# Patient Record
Sex: FEMALE | Race: OTHER | HISPANIC OR LATINO | Employment: UNEMPLOYED | URBAN - METROPOLITAN AREA
[De-identification: names, ages, dates, MRNs, and addresses within clinical notes are randomized per-mention and may not be internally consistent; named-entity substitution may affect disease eponyms.]

---

## 2019-03-04 ENCOUNTER — HOSPITAL ENCOUNTER (EMERGENCY)
Facility: HOSPITAL | Age: 29
Discharge: HOME | End: 2019-03-04
Attending: EMERGENCY MEDICINE

## 2019-03-04 VITALS
TEMPERATURE: 98.5 F | DIASTOLIC BLOOD PRESSURE: 73 MMHG | RESPIRATION RATE: 18 BRPM | HEART RATE: 85 BPM | SYSTOLIC BLOOD PRESSURE: 123 MMHG | WEIGHT: 151 LBS | OXYGEN SATURATION: 100 %

## 2019-03-04 DIAGNOSIS — F41.9 ANXIETY: Primary | ICD-10-CM

## 2019-03-04 PROCEDURE — 99284 EMERGENCY DEPT VISIT MOD MDM: CPT

## 2019-03-04 NOTE — ED NOTES
Patient states that she use to take medication for depression and anxiety but has not in years;         Temo Alves RN  03/04/19 2516

## 2019-03-04 NOTE — ED PROVIDER NOTES
History  Chief Complaint   Patient presents with    Panic Attack     pt reports worsening anxiety and panick attacks over last 3 weeks  pt reports hx of anxiety and depression but has not been taking medication  pt denies SI,HI,VH,AH  History provided by:  Patient   used: No    Panic Attack   Presenting symptoms: depression    Presenting symptoms: no agitation, no homicidal ideas, no paranoid behavior and no suicidal thoughts    Degree of incapacity (severity): Moderate  Onset quality:  Unable to specify  Timing:  Intermittent  Progression:  Waxing and waning  Chronicity:  New  Context: stressful life event    Context comment:  Job, relationships  Treatment compliance:  None of the time  Time since last dose of psychoactive medication: not on meds, unable to specify  Relieved by:  Nothing  Worsened by:  Nothing  Ineffective treatments:  None tried  Associated symptoms: anxiety    Associated symptoms: no abdominal pain, no chest pain and no headaches    Risk factors: hx of mental illness        None       History reviewed  No pertinent past medical history  History reviewed  No pertinent surgical history  History reviewed  No pertinent family history  I have reviewed and agree with the history as documented  Social History     Tobacco Use    Smoking status: Current Every Day Smoker     Packs/day: 0 50    Smokeless tobacco: Current User   Substance Use Topics    Alcohol use: Yes     Alcohol/week: 1 8 oz     Types: 3 Glasses of wine per week    Drug use: Yes     Types: Marijuana        Review of Systems   Constitutional: Negative for chills and fever  HENT: Negative for facial swelling, sore throat and trouble swallowing  Eyes: Negative for pain and visual disturbance  Respiratory: Negative for cough and shortness of breath  Cardiovascular: Negative for chest pain and leg swelling     Gastrointestinal: Negative for abdominal pain, blood in stool, diarrhea, nausea and vomiting  Genitourinary: Negative for dysuria and flank pain  Musculoskeletal: Negative for back pain, neck pain and neck stiffness  Skin: Negative for pallor and rash  Allergic/Immunologic: Negative for environmental allergies and immunocompromised state  Neurological: Negative for dizziness and headaches  Hematological: Negative for adenopathy  Does not bruise/bleed easily  Psychiatric/Behavioral: Negative for agitation, behavioral problems, homicidal ideas, paranoia and suicidal ideas  The patient is nervous/anxious  All other systems reviewed and are negative  Physical Exam  Physical Exam   Constitutional: She is oriented to person, place, and time  She appears well-developed and well-nourished  No distress  HENT:   Head: Normocephalic and atraumatic  Eyes: Pupils are equal, round, and reactive to light  EOM are normal    Neck: Normal range of motion  Neck supple  Cardiovascular: Normal rate, regular rhythm, normal heart sounds and intact distal pulses  Pulmonary/Chest: Effort normal and breath sounds normal    Abdominal: Soft  Bowel sounds are normal  There is no tenderness  There is no rebound and no guarding  Musculoskeletal: Normal range of motion  Neurological: She is alert and oriented to person, place, and time  No cranial nerve deficit  Coordination normal    Skin: Skin is warm and dry  Psychiatric: She has a normal mood and affect  Nursing note and vitals reviewed        Vital Signs  ED Triage Vitals   Temperature Pulse Respirations Blood Pressure SpO2   03/04/19 1400 03/04/19 1400 03/04/19 1400 03/04/19 1401 03/04/19 1400   98 5 °F (36 9 °C) 85 18 123/73 100 %      Temp src Heart Rate Source Patient Position - Orthostatic VS BP Location FiO2 (%)   -- 03/04/19 1400 03/04/19 1400 03/04/19 1400 --    Monitor Sitting Right arm       Pain Score       03/04/19 1400       3           Vitals:    03/04/19 1400 03/04/19 1401   BP:  123/73   Pulse: 85    Patient Position - Orthostatic VS: Sitting        Visual Acuity      ED Medications  Medications - No data to display    Diagnostic Studies  Results Reviewed     None                 No orders to display              Procedures  Procedures       Phone Contacts  ED Phone Contact    ED Course                               MDM  Number of Diagnoses or Management Options  Anxiety: new and does not require workup  Diagnosis management comments: Patient is a 51-year-old female, history of anxiety, depression, not on any meds, no recent psychiatric followup as she has no insurance; feels getting more stressed and anxious these like she is having a panic attack due to the job and relationship stressors, denies suicidal or homicidal ideation  No medical history otherwise  On exam no acute distress, patient does appear tearful and stressed, Vital signs stable, pupils equal round reactive light, no cranial nerve or focal neuro deficits, lungs clear, heart sounds normal   Impression:  Anxiety, stress, panic attack, not suicidal or homicidal, no indication for inpatient psychiatric treatment, crisis worker will meet patient and give outpatient resources  Amount and/or Complexity of Data Reviewed  Discuss the patient with other providers: yes        Disposition  Final diagnoses:   Anxiety     Time reflects when diagnosis was documented in both MDM as applicable and the Disposition within this note     Time User Action Codes Description Comment    3/4/2019  2:31 PM Celia Garrett Add [F41 9] Anxiety       ED Disposition     ED Disposition Condition Date/Time Comment    Discharge Stable Mon Mar 4, 2019  2:31 PM Darwin Dies discharge to home/self care  Follow-up Information     Follow up With Specialties Details Why Contact Info Additional Information    [address]  WITH RESOURCES GIVE TO YOU   [address], [address]          There are no discharge medications for this patient  No discharge procedures on file      ED Provider  Electronically Signed by           Dandre Casey MD  03/04/19 4775

## 2019-03-04 NOTE — ED NOTES
Told patient that if she has any thoughts of SI that she needs to come back to ED; also educated patient on importance of follow up, patient verbalized understanding      Latonya Mccullough, JOSE  03/04/19 3269

## 2019-03-04 NOTE — ED NOTES
Referrals given for outpatient treatment    Patient denies suicidal and homicidal ideations at this time and has requested a therapist for anxiety

## 2019-03-04 NOTE — ED NOTES
Crisis will give patient's outpatient resources before discharge       Veronica Romero RN  03/04/19 5584

## 2019-03-16 ENCOUNTER — HOSPITAL ENCOUNTER (EMERGENCY)
Facility: HOSPITAL | Age: 29
Discharge: HOME | End: 2019-03-16
Attending: EMERGENCY MEDICINE | Admitting: EMERGENCY MEDICINE

## 2019-03-16 VITALS
RESPIRATION RATE: 16 BRPM | WEIGHT: 148.59 LBS | OXYGEN SATURATION: 98 % | TEMPERATURE: 97.9 F | SYSTOLIC BLOOD PRESSURE: 121 MMHG | DIASTOLIC BLOOD PRESSURE: 78 MMHG | HEART RATE: 67 BPM

## 2019-03-16 DIAGNOSIS — N39.0 UTI (URINARY TRACT INFECTION): ICD-10-CM

## 2019-03-16 DIAGNOSIS — O20.0 THREATENED ABORTION: Primary | ICD-10-CM

## 2019-03-16 LAB
ABO GROUP BLD: NORMAL
ABO GROUP BLD: NORMAL
B-HCG SERPL-ACNC: 4774.3 MIU/ML
BACTERIA UR QL AUTO: ABNORMAL /HPF
BILIRUB UR QL STRIP: NEGATIVE
BLD GP AB SCN SERPL QL: NEGATIVE
BLD GP AB SCN SERPL QL: NEGATIVE
CLARITY UR: ABNORMAL
COLOR UR: YELLOW
GLUCOSE UR STRIP-MCNC: NEGATIVE MG/DL
HGB UR QL STRIP.AUTO: 250
KETONES UR STRIP-MCNC: NEGATIVE MG/DL
LEUKOCYTE ESTERASE UR QL STRIP: 25
NITRITE UR QL STRIP: NEGATIVE
NON-SQ EPI CELLS URNS QL MICRO: ABNORMAL /HPF
PH UR STRIP.AUTO: 8 [PH]
PROT UR STRIP-MCNC: NEGATIVE MG/DL
RBC #/AREA URNS AUTO: ABNORMAL /HPF
RH BLD: NEGATIVE
RH BLD: NEGATIVE
SP GR UR STRIP.AUTO: 1.01 (ref 1–1.04)
SPECIMEN EXPIRATION DATE: NORMAL
UROBILINOGEN UA: NEGATIVE MG/DL
WBC #/AREA URNS AUTO: ABNORMAL /HPF

## 2019-03-16 PROCEDURE — 86901 BLOOD TYPING SEROLOGIC RH(D): CPT | Performed by: EMERGENCY MEDICINE

## 2019-03-16 PROCEDURE — 96372 THER/PROPH/DIAG INJ SC/IM: CPT

## 2019-03-16 PROCEDURE — 86900 BLOOD TYPING SEROLOGIC ABO: CPT | Performed by: EMERGENCY MEDICINE

## 2019-03-16 PROCEDURE — 86850 RBC ANTIBODY SCREEN: CPT | Performed by: EMERGENCY MEDICINE

## 2019-03-16 PROCEDURE — 87086 URINE CULTURE/COLONY COUNT: CPT | Performed by: EMERGENCY MEDICINE

## 2019-03-16 PROCEDURE — 99284 EMERGENCY DEPT VISIT MOD MDM: CPT

## 2019-03-16 PROCEDURE — 81001 URINALYSIS AUTO W/SCOPE: CPT | Performed by: EMERGENCY MEDICINE

## 2019-03-16 PROCEDURE — 84702 CHORIONIC GONADOTROPIN TEST: CPT | Performed by: EMERGENCY MEDICINE

## 2019-03-16 PROCEDURE — 36415 COLL VENOUS BLD VENIPUNCTURE: CPT | Performed by: EMERGENCY MEDICINE

## 2019-03-16 PROCEDURE — 81003 URINALYSIS AUTO W/O SCOPE: CPT | Performed by: EMERGENCY MEDICINE

## 2019-03-16 RX ORDER — CEPHALEXIN 250 MG/1
500 CAPSULE ORAL 4 TIMES DAILY
Qty: 56 CAPSULE | Refills: 0 | Status: SHIPPED | OUTPATIENT
Start: 2019-03-16 | End: 2019-03-23

## 2019-03-16 RX ORDER — ACETAMINOPHEN 325 MG/1
1000 TABLET ORAL ONCE
Status: COMPLETED | OUTPATIENT
Start: 2019-03-16 | End: 2019-03-16

## 2019-03-16 RX ADMIN — ACETAMINOPHEN 975 MG: 325 TABLET ORAL at 16:54

## 2019-03-16 RX ADMIN — RHO(D) IMMUNE GLOBULIN (HUMAN) 300 MCG: 1500 SOLUTION INTRAMUSCULAR at 16:55

## 2019-03-16 NOTE — ED PROVIDER NOTES
History  Chief Complaint   Patient presents with    Abdominal Pain     staets that she is about 7 weeks pregnant and has been having abd and back pain since this morning  states that she is also ahving nausea and vaginal bleeding     Patient is a 59-year-old  presents with a 1 day history of crampy lower abdominal pain that radiates the back as well as vaginal bleeding  Patient is currently 7 weeks pregnant  Patient is not any prenatal care and is actually planning to have an  scheduled next Saturday  States pain began today crampy low abdominal pain that radiates the back  Did not take anything for it  States used 3 pads today  Patient also endorses some nausea  No fevers sweats or chills  Nothing makes it better or worse  None       History reviewed  No pertinent past medical history  Past Surgical History:   Procedure Laterality Date    BREAST IMPLANT       SECTION         History reviewed  No pertinent family history  I have reviewed and agree with the history as documented  Social History     Tobacco Use    Smoking status: Current Every Day Smoker     Packs/day: 0 50    Smokeless tobacco: Current User   Substance Use Topics    Alcohol use: Yes     Comment: occasional    Drug use: Yes     Types: Marijuana     Comment: few times per week        Review of Systems   Constitutional: Positive for appetite change  Negative for activity change, chills and fever  HENT: Negative  Eyes: Negative  Respiratory: Negative  Negative for cough and shortness of breath  Cardiovascular: Negative  Negative for leg swelling  Gastrointestinal: Positive for abdominal pain  Endocrine: Negative  Genitourinary: Positive for vaginal bleeding  Musculoskeletal: Negative  Skin: Negative  Allergic/Immunologic: Negative  Neurological: Negative  Hematological: Negative  Psychiatric/Behavioral: Negative      All other systems reviewed and are negative  Physical Exam  Physical Exam   Constitutional: She is oriented to person, place, and time  She appears well-developed and well-nourished  HENT:   Head: Normocephalic  Mouth/Throat: Oropharynx is clear and moist    Eyes: Pupils are equal, round, and reactive to light  Cardiovascular: Normal rate and normal heart sounds  Pulmonary/Chest: Effort normal and breath sounds normal    Abdominal: Soft  Normal appearance and bowel sounds are normal  There is tenderness in the suprapubic area  There is no rigidity, no rebound, no guarding and no CVA tenderness  Genitourinary:   Genitourinary Comments: Bimanual exam done by me with Erwin Duran RN chaperone  Os closed  Minimal blood on exam    Neurological: She is alert and oriented to person, place, and time  Skin: Skin is warm and dry  Capillary refill takes less than 2 seconds  Psychiatric: She has a normal mood and affect  Nursing note and vitals reviewed        Vital Signs  ED Triage Vitals [03/16/19 1454]   Temperature Pulse Respirations Blood Pressure SpO2   97 9 °F (36 6 °C) 95 16 141/85 100 %      Temp Source Heart Rate Source Patient Position - Orthostatic VS BP Location FiO2 (%)   Tympanic Monitor Sitting Left arm --      Pain Score       --           Vitals:    03/16/19 1454   BP: 141/85   Pulse: 95   Patient Position - Orthostatic VS: Sitting       qSOFA     Row Name 03/16/19 1454                Altered mental status GCS < 15  --        Respiratory Rate > / =21  0        Systolic BP < / =481  0        Q Sofa Score  0              Visual Acuity      ED Medications  Medications - No data to display    Diagnostic Studies  Results Reviewed     Procedure Component Value Units Date/Time    UA w Reflex to Microscopic w Reflex to Culture [[id number]]     Lab Status:  No result Specimen:  Urine     hCG, quantitative [[id number]]     Lab Status:  No result Specimen:  Blood                  No orders to display              Procedures  Procedures Phone Contacts  ED Phone Contact    ED Course  ED Course as of Mar 17 0756   Sat Mar 16, 2019   1513 Bedside ultrasound done by me with positive IUP  Difficult to visualize heartbeat due to pending limited study  Patient declined to look at scan  1642 One over results with patient  Will give antibiotics patient also requesting something for the cramps  Will give Tylenol  Explained program artery status  Patient still plan of followup on Saturday the wound center  Still advised patient to take pregnancy safe drugs for now just in case change of mind  MDM  Number of Diagnoses or Management Options  Threatened :   UTI (urinary tract infection):      Amount and/or Complexity of Data Reviewed  Clinical lab tests: ordered and reviewed        Disposition  Final diagnoses:   None     ED Disposition     None      Follow-up Information    None         Patient's Medications    No medications on file     No discharge procedures on file      ED Provider  Electronically Signed by           Anita Cheng MD  [phone]

## 2019-03-17 ENCOUNTER — APPOINTMENT (EMERGENCY)
Dept: ULTRASOUND IMAGING | Facility: HOSPITAL | Age: 29
End: 2019-03-17

## 2019-03-17 ENCOUNTER — HOSPITAL ENCOUNTER (OUTPATIENT)
Facility: HOSPITAL | Age: 29
Setting detail: OBSERVATION
Discharge: HOME | End: 2019-03-18
Attending: EMERGENCY MEDICINE | Admitting: OBSTETRICS & GYNECOLOGY

## 2019-03-17 DIAGNOSIS — O03.9 SAB (SPONTANEOUS ABORTION): ICD-10-CM

## 2019-03-17 DIAGNOSIS — O03.9 MISCARRIAGE: Primary | ICD-10-CM

## 2019-03-17 PROBLEM — Z3A.01 LESS THAN 8 WEEKS GESTATION OF PREGNANCY: Status: ACTIVE | Noted: 2019-03-17

## 2019-03-17 LAB
ALBUMIN SERPL BCP-MCNC: 3.3 G/DL (ref 3.5–5)
ALP SERPL-CCNC: 49 U/L (ref 46–116)
ALT SERPL W P-5'-P-CCNC: 13 U/L (ref 12–78)
ANION GAP SERPL CALCULATED.3IONS-SCNC: 9 MMOL/L (ref 4–13)
AST SERPL W P-5'-P-CCNC: 10 U/L (ref 5–45)
B-HCG SERPL-ACNC: 4536.3 MIU/ML
BACTERIA UR CULT: NORMAL
BASOPHILS # BLD AUTO: 0.04 THOUSANDS/ΜL (ref 0–0.1)
BASOPHILS # BLD AUTO: 0.07 THOUSANDS/ΜL (ref 0–0.1)
BASOPHILS NFR BLD AUTO: 0 % (ref 0–1)
BASOPHILS NFR BLD AUTO: 1 % (ref 0–1)
BILIRUB SERPL-MCNC: 0.28 MG/DL (ref 0.2–1)
BUN SERPL-MCNC: 7 MG/DL (ref 5–25)
CALCIUM SERPL-MCNC: 8.2 MG/DL (ref 8.3–10.1)
CHLORIDE SERPL-SCNC: 107 MMOL/L (ref 100–108)
CO2 SERPL-SCNC: 24 MMOL/L (ref 21–32)
CREAT SERPL-MCNC: 0.52 MG/DL (ref 0.6–1.3)
EOSINOPHIL # BLD AUTO: 0.05 THOUSAND/ΜL (ref 0–0.61)
EOSINOPHIL # BLD AUTO: 0.2 THOUSAND/ΜL (ref 0–0.61)
EOSINOPHIL NFR BLD AUTO: 1 % (ref 0–6)
EOSINOPHIL NFR BLD AUTO: 1 % (ref 0–6)
ERYTHROCYTE [DISTWIDTH] IN BLOOD BY AUTOMATED COUNT: 13.1 % (ref 11.6–15.1)
ERYTHROCYTE [DISTWIDTH] IN BLOOD BY AUTOMATED COUNT: 13.2 % (ref 11.6–15.1)
GFR SERPL CREATININE-BSD FRML MDRD: 130 ML/MIN/1.73SQ M
GLUCOSE SERPL-MCNC: 87 MG/DL (ref 65–140)
HCT VFR BLD AUTO: 39 % (ref 34.8–46.1)
HCT VFR BLD AUTO: 46.2 % (ref 34.8–46.1)
HGB BLD-MCNC: 12.5 G/DL (ref 11.5–15.4)
HGB BLD-MCNC: 14.9 G/DL (ref 11.5–15.4)
IMM GRANULOCYTES # BLD AUTO: 0.02 THOUSAND/UL (ref 0–0.2)
IMM GRANULOCYTES # BLD AUTO: 0.04 THOUSAND/UL (ref 0–0.2)
IMM GRANULOCYTES NFR BLD AUTO: 0 % (ref 0–2)
IMM GRANULOCYTES NFR BLD AUTO: 0 % (ref 0–2)
LYMPHOCYTES # BLD AUTO: 1.29 THOUSANDS/ΜL (ref 0.6–4.47)
LYMPHOCYTES # BLD AUTO: 1.73 THOUSANDS/ΜL (ref 0.6–4.47)
LYMPHOCYTES NFR BLD AUTO: 12 % (ref 14–44)
LYMPHOCYTES NFR BLD AUTO: 14 % (ref 14–44)
MCH RBC QN AUTO: 30.3 PG (ref 26.8–34.3)
MCH RBC QN AUTO: 30.3 PG (ref 26.8–34.3)
MCHC RBC AUTO-ENTMCNC: 32.1 G/DL (ref 31.4–37.4)
MCHC RBC AUTO-ENTMCNC: 32.3 G/DL (ref 31.4–37.4)
MCV RBC AUTO: 94 FL (ref 82–98)
MCV RBC AUTO: 94 FL (ref 82–98)
MONOCYTES # BLD AUTO: 0.34 THOUSAND/ΜL (ref 0.17–1.22)
MONOCYTES # BLD AUTO: 0.62 THOUSAND/ΜL (ref 0.17–1.22)
MONOCYTES NFR BLD AUTO: 4 % (ref 4–12)
MONOCYTES NFR BLD AUTO: 4 % (ref 4–12)
NEUTROPHILS # BLD AUTO: 11.68 THOUSANDS/ΜL (ref 1.85–7.62)
NEUTROPHILS # BLD AUTO: 7.21 THOUSANDS/ΜL (ref 1.85–7.62)
NEUTS SEG NFR BLD AUTO: 81 % (ref 43–75)
NEUTS SEG NFR BLD AUTO: 82 % (ref 43–75)
NRBC BLD AUTO-RTO: 0 /100 WBCS
NRBC BLD AUTO-RTO: 0 /100 WBCS
PLATELET # BLD AUTO: 304 THOUSANDS/UL (ref 149–390)
PLATELET # BLD AUTO: 413 THOUSANDS/UL (ref 149–390)
PMV BLD AUTO: 10.1 FL (ref 8.9–12.7)
PMV BLD AUTO: 9.5 FL (ref 8.9–12.7)
POTASSIUM SERPL-SCNC: 3.9 MMOL/L (ref 3.5–5.3)
PROT SERPL-MCNC: 6.3 G/DL (ref 6.4–8.2)
RBC # BLD AUTO: 4.13 MILLION/UL (ref 3.81–5.12)
RBC # BLD AUTO: 4.92 MILLION/UL (ref 3.81–5.12)
SODIUM SERPL-SCNC: 140 MMOL/L (ref 136–145)
WBC # BLD AUTO: 14.34 THOUSAND/UL (ref 4.31–10.16)
WBC # BLD AUTO: 8.95 THOUSAND/UL (ref 4.31–10.16)

## 2019-03-17 PROCEDURE — 76801 OB US < 14 WKS SINGLE FETUS: CPT

## 2019-03-17 PROCEDURE — 80053 COMPREHEN METABOLIC PANEL: CPT | Performed by: OBSTETRICS & GYNECOLOGY

## 2019-03-17 PROCEDURE — 96376 TX/PRO/DX INJ SAME DRUG ADON: CPT

## 2019-03-17 PROCEDURE — 96361 HYDRATE IV INFUSION ADD-ON: CPT

## 2019-03-17 PROCEDURE — 85025 COMPLETE CBC W/AUTO DIFF WBC: CPT | Performed by: OBSTETRICS & GYNECOLOGY

## 2019-03-17 PROCEDURE — 99218 PR INITIAL OBSERVATION CARE/DAY 30 MINUTES: CPT | Performed by: OBSTETRICS & GYNECOLOGY

## 2019-03-17 PROCEDURE — 99285 EMERGENCY DEPT VISIT HI MDM: CPT

## 2019-03-17 PROCEDURE — 96374 THER/PROPH/DIAG INJ IV PUSH: CPT

## 2019-03-17 PROCEDURE — 84702 CHORIONIC GONADOTROPIN TEST: CPT | Performed by: EMERGENCY MEDICINE

## 2019-03-17 PROCEDURE — 96375 TX/PRO/DX INJ NEW DRUG ADDON: CPT

## 2019-03-17 PROCEDURE — 85025 COMPLETE CBC W/AUTO DIFF WBC: CPT | Performed by: EMERGENCY MEDICINE

## 2019-03-17 PROCEDURE — 36415 COLL VENOUS BLD VENIPUNCTURE: CPT | Performed by: EMERGENCY MEDICINE

## 2019-03-17 RX ORDER — ONDANSETRON 2 MG/ML
4 INJECTION INTRAMUSCULAR; INTRAVENOUS ONCE
Status: COMPLETED | OUTPATIENT
Start: 2019-03-17 | End: 2019-03-17

## 2019-03-17 RX ORDER — MORPHINE SULFATE 4 MG/ML
4 INJECTION, SOLUTION INTRAMUSCULAR; INTRAVENOUS
Status: COMPLETED | OUTPATIENT
Start: 2019-03-17 | End: 2019-03-17

## 2019-03-17 RX ORDER — HYDROMORPHONE HCL/PF 1 MG/ML
1 SYRINGE (ML) INJECTION EVERY 4 HOURS PRN
Status: DISCONTINUED | OUTPATIENT
Start: 2019-03-17 | End: 2019-03-18 | Stop reason: HOSPADM

## 2019-03-17 RX ORDER — ACETAMINOPHEN 325 MG/1
650 TABLET ORAL EVERY 6 HOURS PRN
Status: DISCONTINUED | OUTPATIENT
Start: 2019-03-17 | End: 2019-03-18 | Stop reason: HOSPADM

## 2019-03-17 RX ORDER — OXYCODONE HYDROCHLORIDE AND ACETAMINOPHEN 5; 325 MG/1; MG/1
1 TABLET ORAL EVERY 4 HOURS PRN
Status: DISCONTINUED | OUTPATIENT
Start: 2019-03-17 | End: 2019-03-18 | Stop reason: HOSPADM

## 2019-03-17 RX ORDER — DIPHENHYDRAMINE HYDROCHLORIDE 50 MG/ML
25 INJECTION INTRAMUSCULAR; INTRAVENOUS EVERY 6 HOURS PRN
Status: DISCONTINUED | OUTPATIENT
Start: 2019-03-17 | End: 2019-03-18 | Stop reason: HOSPADM

## 2019-03-17 RX ORDER — KETOROLAC TROMETHAMINE 30 MG/ML
30 INJECTION, SOLUTION INTRAMUSCULAR; INTRAVENOUS EVERY 6 HOURS PRN
Status: DISCONTINUED | OUTPATIENT
Start: 2019-03-17 | End: 2019-03-17

## 2019-03-17 RX ORDER — SODIUM CHLORIDE, SODIUM LACTATE, POTASSIUM CHLORIDE, CALCIUM CHLORIDE 600; 310; 30; 20 MG/100ML; MG/100ML; MG/100ML; MG/100ML
125 INJECTION, SOLUTION INTRAVENOUS CONTINUOUS
Status: DISCONTINUED | OUTPATIENT
Start: 2019-03-17 | End: 2019-03-18 | Stop reason: HOSPADM

## 2019-03-17 RX ADMIN — SODIUM CHLORIDE 1000 ML: 0.9 INJECTION, SOLUTION INTRAVENOUS at 17:11

## 2019-03-17 RX ADMIN — MORPHINE SULFATE 4 MG: 4 INJECTION INTRAVENOUS at 17:12

## 2019-03-17 RX ADMIN — HYDROMORPHONE HYDROCHLORIDE 1 MG: 1 INJECTION, SOLUTION INTRAMUSCULAR; INTRAVENOUS; SUBCUTANEOUS at 21:53

## 2019-03-17 RX ADMIN — ONDANSETRON 4 MG: 2 INJECTION INTRAMUSCULAR; INTRAVENOUS at 13:38

## 2019-03-17 RX ADMIN — SODIUM CHLORIDE, SODIUM LACTATE, POTASSIUM CHLORIDE, AND CALCIUM CHLORIDE 125 ML/HR: .6; .31; .03; .02 INJECTION, SOLUTION INTRAVENOUS at 18:07

## 2019-03-17 RX ADMIN — MORPHINE SULFATE 4 MG: 4 INJECTION INTRAVENOUS at 14:35

## 2019-03-17 RX ADMIN — MORPHINE SULFATE 4 MG: 4 INJECTION INTRAVENOUS at 13:38

## 2019-03-17 RX ADMIN — SODIUM CHLORIDE 1000 ML: 0.9 INJECTION, SOLUTION INTRAVENOUS at 13:39

## 2019-03-17 NOTE — H&P
H&P Exam - Gynecology   Annie Bañuelos 29 y o  female MRN: [id number]  Unit/Bed#: ED 30 Encounter: [id number]    Chief Complaint   Patient presents with    Vaginal Bleeding - Pregnant     Pt reports abd pain and vaginal bleeding that began yesterday, seen at [facility] for same  Today increased pain and bleeding  Pt has used 3 pads since 0600 this AM          History of Present Illness      HPI:  Annie Bañuelos is a 29 y  o  at [address] per LMP 19 female who presents with vaginal bleeding and cramping  Patient presented to [facility] ED yesterday with similar symptoms  Bedside ultrasound showed positive IUP  Heartbeat difficult to visualize  She was discharged with precautions after she was given rhogam for Rh negative status (confirmed given at 1656)  Patient states that her vaginal bleeding started again this morning  She has been changing her pad once ever hour  Upon initial evaluation, her pain was 10/10 and generalized  Now, she has 0/10 pain after being given 4mg x 3 (total 12mg morphine) since initial eval for severe pain  Patient states she has been passing clots but no products of conception  Imaging showed 6w3d intrauterine pregnancy  However, the gestational sac is located low in the uterine cavity, adjacent to a  scar so implantation at the scar cannot be excluded  Due to risk of  ectopic, OBGYN was consulted  Brief OBGYN history:     Patient is a   This is her 3rd pregnancy  She had one  section in  secondary to arrest of dilation at [facility]  She then had an elective termination in   This is not a planned pregnancy  She was on the pill for contraception but is unsure what it is called  She has a history of irregular menses  Relevant surgical history:  section x 1, breast implantation, abdominoplasty    Review of Systems   Constitutional: Negative  Respiratory: Negative  Cardiovascular: Negative  Gastrointestinal: Negative  Genitourinary: Negative  Musculoskeletal: Negative  Historical Information   History reviewed  No pertinent past medical history  Past Surgical History:   Procedure Laterality Date    BREAST IMPLANT       SECTION       History reviewed  No pertinent family history  Social History   Social History     Substance and Sexual Activity   Alcohol Use Yes    Comment: occasional     Social History     Substance and Sexual Activity   Drug Use Yes    Types: Marijuana    Comment: few times per week     Social History     Tobacco Use   Smoking Status Current Every Day Smoker    Packs/day: 0 50   Smokeless Tobacco Current User       Meds/Allergies     (Not in a hospital admission)  No Known Allergies    Objective   /66 (BP Location: Left arm)   Pulse 78   Temp (!) 97 1 °F (36 2 °C) (Temporal)   Resp 18   Wt 64 kg (141 lb 3 2 oz)   LMP 2019 (Approximate)   SpO2 100%     No intake or output data in the 24 hours ending 19    Invasive Devices: Invasive Devices     Peripheral Intravenous Line            Peripheral IV 19 Right Antecubital less than 1 day              Physical Exam   Constitutional: She is oriented to person, place, and time  She appears well-developed and well-nourished  Cardiovascular: Normal rate, regular rhythm and normal heart sounds  Pulmonary/Chest: Effort normal and breath sounds normal    Abdominal: Soft  Bowel sounds are normal    Genitourinary:   Genitourinary Comments: Speculum exam: dark red blood pooling in posterior fornix and coating the cervix and vaginal side walls, cervix appears 5mm dilated with no POC visualized  No active bleeding  Bimanual exam was unremarkable  Negative CMT  Uterus palpated to be slightly larger than nongravid uterus but non tender  No adnexal tenderness or fullness  Neurological: She is alert and oriented to person, place, and time  Vitals reviewed       Lab Results:   Admission on 2019   Component Date Value    HCG, Quant 2019 4,536 3*    WBC 2019 14 34*    RBC 2019 4 92     Hemoglobin 2019 14 9     Hematocrit 2019 46 2*    MCV 2019 94     MCH 2019 30 3     MCHC 2019 32 3     RDW 2019 13 2     MPV 2019 10 1     Platelets  413*    nRBC 2019 0     Neutrophils Relative 2019 82*    Immat GRANS % 2019 0     Lymphocytes Relative 2019 12*    Monocytes Relative 2019 4     Eosinophils Relative 2019 1     Basophils Relative 2019 1     Neutrophils Absolute 2019 11 68*    Immature Grans Absolute 2019 0 04     Lymphocytes Absolute 2019 1 73     Monocytes Absolute 2019 0 62     Eosinophils Absolute 2019 0 20     Basophils Absolute 2019 0 07     WBC 2019 8 95     RBC 2019 4 13     Hemoglobin 2019 12 5     Hematocrit 2019 39 0     MCV 2019 94     MCH 2019 30 3     MCHC 2019 32 1     RDW 2019 13 1     MPV 2019 9 5     Platelets  304     nRBC 2019 0     Neutrophils Relative 2019 81*    Immat GRANS % 2019 0     Lymphocytes Relative 2019 14     Monocytes Relative 2019 4     Eosinophils Relative 2019 1     Basophils Relative 2019 0     Neutrophils Absolute 2019 7 21     Immature Grans Absolute 2019 0 02     Lymphocytes Absolute 2019 1 29     Monocytes Absolute 2019 0 34     Eosinophils Absolute 2019 0 05     Basophils Absolute 2019 0 04       Imaging:     FIRST TRIMESTER OBSTETRIC ULTRASOUND, COMPLETE     INDICATION: 30-year-old pregnant woman, 1st trimester, complaining of low back and pelvic pain and vaginal bleeding for 2 days  Rule out ectopic pregnancy  LMP is 2019  Quantitative beta hC mIU/ml  Yesterday, it was 4774 mIU/ml    History   of       COMPARISON: None      TECHNIQUE:   Transabdominal ultrasound of the pelvis was performed  Additional transvaginal imaging was then performed to better assess the gestation, myometrial/endometrial architecture and ovarian parenchymal detail  The study includes volumetric   sweeps and traditional still imaging technique       FINDINGS:     UTERUS:     UTERINE CAVITY:  INTRAUTERINE GESTATIONAL SAC: Present with somewhat irregular contour  Appears to be implanted and/or near a  scar  YOLK SAC:  Present and normal in size and appearance  Robert Ingles MEAN GESTATIONAL SAC DIAMETER: 13 mm = 5 weeks 3 days +/- 1 weeks 0 days  EMBRYO / FETUS PRESENT: Single embryo present  MEAN CROWN RUMP LENGTH:  6 to 7 mm mm = 6 weeks 3 days +/- 2 days  CARDIAC ACTIVITY is not present  EMBRYONIC ANATOMY:  Appropriate for gestational age  AMNIOTIC FLUID/SAC SHAPE:  Somewhat irregular contour  Decidual reaction less prominent than normal   SUBCHORIONIC COLLECTION: None     MYOMETRIUM: Normal myometrial echogenicity  No evidence of uterine mass   scar in the anterior aspect of the lower uterine segment  CERVIX: Normal  Endocervical canal closed      ADNEXA:  Left ovary:  3 3 x 1 4 x 2 6 cm  No mass  Right ovary:  2 7 x 1 8 x 2 6 cm  Small hemorrhagic corpus luteum  Otherwise, no mass      FREE FLUID: No free fluid present         IMPRESSION:     1  Single intrauterine pregnancy with a crown-rump length equivalent to a gestational age of 7 weeks 3 days  2   Absent embryonic cardiac activity and somewhat irregular contour of the gestational sac, findings consistent with nonviable pregnancy  3   The gestational sac is located low in the uterine cavity, adjacent to a  scar  Implantation at the scar cannot be excluded  Assessment/Plan     A/P:     29yo  at 6w6d by LMP with spontaneous  of IUP vs  ectopic      1) IUP miscarriage vs  ectopic    With absent embryonic activity in a gestation with 6-7mm mean crown rump length at 6w3d and clinical history of vaginal bleeding and abdominal cramping, this is likely an intrauterine miscarriage  However, per radiology, a  ectopic pregnancy cannot be excluded as the gestational sac is located low in the uterine cavity  We discussed with patient both of these scenarios and need for further imaging for diagnosis  We discussed that if this is an IUP, we can manage expectantly, medically, or surgically  If this is a  ectopic, we will need to discuss management options including methotrexate vs surgical resection  Because there is a concern for  ectopic, we discussed with patient that she needs to stay in the hospital for observation and obtain MRI in the morning  Will keep NPO and type and cross 2U pRBC     - S/p rhogam 3/16  - F/u CMP and repeat CBC at   - F/u CBC and b-hcg at 0600 3/18  - Type and cross for 2U pRBC  Starting Hgb 14 9g/dL  - MRI to rule out  ectopic - talked to Dr Shelbie Bhat of radiology who said that they can complete MRI tomorrow morning   - Analgesia: tylenol, roxicodone, morphine prn    2) Diet: NPO   - Last meal was at 0100 this morning   - She has only been drinking water since then      3) DVT ppx: ambulation, SCDs     D/w Dr Priscilla Harris    Code Status: No Order  Advance Directive and Living Will:      Power of :    POLST:      Wilfrido Barraza MD  3/17/2019  6:28 PM

## 2019-03-17 NOTE — ED NOTES
Pt called Rn into bathroom where pt unsure of what passed a specimen into the toilet; specimen collected into sterile container       Uday Duggan RN  03/17/19 1851

## 2019-03-17 NOTE — ED PROVIDER NOTES
History  Chief Complaint   Patient presents with    Vaginal Bleeding - Pregnant     Pt reports abd pain and vaginal bleeding that began yesterday, seen at [facility] for same  Today increased pain and bleeding  Pt has used 3 pads since 0600 this AM      80-year-old  7 week pregnant female presents for evaluation of vaginal bleeding and suprapubic pain  The patient states that she began with bleeding yesterday  She was seen at [facility] and discharged with threatened miscarriage  She states that since that emergency department visit, she has had worsening pain and increased bleeding with clots  The patient denies any associated fever, chills  She has nausea  The patient has not had any prenatal care to this point and is planning to terminate the pregnancy  Prior to Admission Medications   Prescriptions Last Dose Informant Patient Reported? Taking? cephalexin (KEFLEX) 250 mg capsule   No No   Sig: Take 2 capsules (500 mg total) by mouth 4 (four) times a day for 7 days      Facility-Administered Medications: None       History reviewed  No pertinent past medical history  Past Surgical History:   Procedure Laterality Date    BREAST IMPLANT       SECTION         History reviewed  No pertinent family history  I have reviewed and agree with the history as documented  Social History     Tobacco Use    Smoking status: Current Every Day Smoker     Packs/day: 0 50    Smokeless tobacco: Current User   Substance Use Topics    Alcohol use: Yes     Comment: occasional    Drug use: Yes     Types: Marijuana     Comment: few times per week        Review of Systems   Constitutional: Negative for chills and fever  Gastrointestinal: Positive for abdominal pain, nausea and vomiting  Genitourinary: Positive for pelvic pain and vaginal bleeding  All other systems reviewed and are negative        Physical Exam  Physical Exam   Constitutional: She is oriented to person, place, and time  She appears well-developed and well-nourished  No distress  In pain   HENT:   Head: Normocephalic and atraumatic  Right Ear: External ear normal    Left Ear: External ear normal    Eyes: Pupils are equal, round, and reactive to light  Conjunctivae and EOM are normal  No scleral icterus  Neck: Normal range of motion  Cardiovascular: Normal rate, regular rhythm and normal heart sounds  Pulmonary/Chest: Effort normal and breath sounds normal  No respiratory distress  Abdominal: Soft  Bowel sounds are normal  There is tenderness  There is no rebound and no guarding  Musculoskeletal: Normal range of motion  She exhibits no edema  Neurological: She is alert and oriented to person, place, and time  Skin: Skin is warm and dry  No rash noted  Psychiatric: She has a normal mood and affect  Nursing note and vitals reviewed        Vital Signs  ED Triage Vitals   Temperature Pulse Respirations Blood Pressure SpO2   03/17/19 1158 03/17/19 1159 03/17/19 1159 03/17/19 1159 03/17/19 1159   (!) 97 1 °F (36 2 °C) 70 18 136/63 98 %      Temp Source Heart Rate Source Patient Position - Orthostatic VS BP Location FiO2 (%)   03/17/19 1158 03/17/19 1536 03/17/19 1159 03/17/19 1159 --   Temporal Monitor Sitting Right arm       Pain Score       03/17/19 1158       Worst Possible Pain           Vitals:    03/17/19 1159 03/17/19 1536 03/17/19 1703   BP: 136/63 93/57 (!) 88/52   Pulse: 70 63 66   Patient Position - Orthostatic VS: Sitting Lying Lying       qSOFA     Row Name 03/17/19 1703 03/17/19 1536 03/17/19 1159          Altered mental status GCS < 15  --  --  --      Respiratory Rate > / =22  0  0  0      Systolic BP < / =897  1  1  0      Q Sofa Score  1  1  0            Visual Acuity      ED Medications  Medications   sodium chloride 0 9 % bolus 1,000 mL (1,000 mL Intravenous New Bag 3/17/19 1711)   sodium chloride 0 9 % bolus 1,000 mL (0 mL Intravenous Stopped 3/17/19 1434)   morphine (PF) 4 mg/mL injection 4 mg (4 mg Intravenous Given 3/17/19 1712)   ondansetron (ZOFRAN) injection 4 mg (4 mg Intravenous Given 3/17/19 1338)       Diagnostic Studies  Results Reviewed     Procedure Component Value Units Date/Time    CBC and differential [[id number]]  (Abnormal) Collected:  03/17/19 1626    Lab Status:  Final result Specimen:  Blood from Arm, Right Updated:  03/17/19 1632     WBC 14 34 Thousand/uL      RBC 4 92 Million/uL      Hemoglobin 14 9 g/dL      Hematocrit 46 2 %      MCV 94 fL      MCH 30 3 pg      MCHC 32 3 g/dL      RDW 13 2 %      MPV 10 1 fL      Platelets 297 Thousands/uL      nRBC 0 /100 WBCs      Neutrophils Relative 82 %      Immat GRANS % 0 %      Lymphocytes Relative 12 %      Monocytes Relative 4 %      Eosinophils Relative 1 %      Basophils Relative 1 %      Neutrophils Absolute 11 68 Thousands/µL      Immature Grans Absolute 0 04 Thousand/uL      Lymphocytes Absolute 1 73 Thousands/µL      Monocytes Absolute 0 62 Thousand/µL      Eosinophils Absolute 0 20 Thousand/µL      Basophils Absolute 0 07 Thousands/µL     Quantitative hCG [[id number]]  (Abnormal) Collected:  03/17/19 1340    Lab Status:  Final result Specimen:  Blood from Arm, Right Updated:  03/17/19 1424     HCG, Quant 4,536 3 mIU/mL     Narrative:        Expected Ranges:   Approximate               Approximate HCG  Gestation age          Concentration ( mIU/mL)  _____________          ______________________   Alba Gustavo                      HCG values  0 2-1                       5-50  1-2                           2-3                         100-5000  3-4                         500-42216  4-5                         1000-92876  5-6                         [id number]  6-8                         55110-106797  8-12                        [id number]                 US OB < 14 weeks with transvaginal   Final Result by Socorro Garcia MD (03/17 1644)      1    Single intrauterine pregnancy with a crown-rump length equivalent to a gestational age of 7 weeks 3 days  2   Absent embryonic cardiac activity and somewhat irregular contour of the gestational sac, findings consistent with nonviable pregnancy  3   The gestational sac is located low in the uterine cavity, adjacent to a  scar  Implantation at the scar cannot be excluded  I personally discussed this study with [place] on 3/17/2019 at 4:44 PM                Workstation performed: [id number]                    Procedures  Procedures       Phone Contacts  ED Phone Contact    ED Course  ED Course as of Mar 17 173   Sun Mar 17, 2019   1511 OB resident paged      [phone] Discussed with OB  Plan is to get a formal ultrasound  Further review of the record yesterday demonstrates the patient had a bedside ultrasound with out clear heartbeat  There is no definitive IUP on examination today and formal ultrasound will be obtained to rule out ectopic  I do note that the patient was noted to be Rh negative yesterday and there is no evidence that RhoGAM was given      1639 Non viable low SIUP 6w3D +/- 3D 6mm length  Not well formed sac  Near  scar  Per d/w Dr Aidee Reed      1881 OB resident paged      [phone] Discussed with Mustapha Ramos from [facility]  51    She will discussed with Radiology and then see the patient at the bedside      1736 Patient seen by OBGYN plans to keep the patient for observation and order pelvic MRI                                  MDM  Number of Diagnoses or Management Options  Miscarriage: new and requires workup  Diagnosis management comments: Differential diagnosis:  Threatened miscarriage, inevitable miscarriage, missed AB, ectopic, other    All labs reviewed and utilized in the medical decision making process    All radiology studies independently viewed by me and interpreted by the radiologist          Amount and/or Complexity of Data Reviewed  Clinical lab tests: ordered and reviewed  Tests in the radiology section of CPT®: ordered and reviewed  Review and summarize past medical records: yes  Discuss the patient with other providers: yes  Independent visualization of images, tracings, or specimens: yes        Disposition  Final diagnoses:   Miscarriage     Time reflects when diagnosis was documented in both MDM as applicable and the Disposition within this note     Time User Action Codes Description Comment    3/17/2019  4:52 PM Elba Sánchez Add [O03 9] Miscarriage       ED Disposition     ED Disposition Condition Date/Time Comment    Admit Stable Sun Mar 17, 2019  5:39 PM Case was discussed with Dr Ivon Malik and the patient's admission status was agreed to be Admission Status: observation status to the service of Dr Ivon Malik   Follow-up Information    None         Patient's Medications   Discharge Prescriptions    No medications on file     No discharge procedures on file      ED Provider  Electronically Signed by           Chani Gallegos DO  03/17/19 8004

## 2019-03-17 NOTE — LETTER
[address] [address]  Dept: [phone]    March 18, 2019     Patient: Goldie Ruiz   YOB: 1990   Date of Visit: 3/17/2019       To Whom it May Concern:    Goldie Ruiz is under my professional care  She was admitted to the hospital from 3/17/2019   to 03/18/19  She should remain out of work until she is cleared by her physicians at her next outpatient visit, which will occur in approximately 1 week  If you have any questions or concerns, please don't hesitate to call           Sincerely,          Benjaman Gowers, MD

## 2019-03-18 VITALS
HEART RATE: 63 BPM | TEMPERATURE: 98.8 F | BODY MASS INDEX: 34.59 KG/M2 | RESPIRATION RATE: 16 BRPM | DIASTOLIC BLOOD PRESSURE: 70 MMHG | WEIGHT: 149.47 LBS | OXYGEN SATURATION: 96 % | SYSTOLIC BLOOD PRESSURE: 122 MMHG | HEIGHT: 55 IN

## 2019-03-18 PROBLEM — O03.9 SAB (SPONTANEOUS ABORTION): Status: ACTIVE | Noted: 2019-03-18

## 2019-03-18 LAB
B-HCG SERPL-ACNC: 2460.8 MIU/ML
BASOPHILS # BLD AUTO: 0.03 THOUSANDS/ΜL (ref 0–0.1)
BASOPHILS NFR BLD AUTO: 1 % (ref 0–1)
EOSINOPHIL # BLD AUTO: 0.23 THOUSAND/ΜL (ref 0–0.61)
EOSINOPHIL NFR BLD AUTO: 4 % (ref 0–6)
ERYTHROCYTE [DISTWIDTH] IN BLOOD BY AUTOMATED COUNT: 13.1 % (ref 11.6–15.1)
HCT VFR BLD AUTO: 35.5 % (ref 34.8–46.1)
HGB BLD-MCNC: 11.5 G/DL (ref 11.5–15.4)
IMM GRANULOCYTES # BLD AUTO: 0.01 THOUSAND/UL (ref 0–0.2)
IMM GRANULOCYTES NFR BLD AUTO: 0 % (ref 0–2)
LYMPHOCYTES # BLD AUTO: 1.76 THOUSANDS/ΜL (ref 0.6–4.47)
LYMPHOCYTES NFR BLD AUTO: 27 % (ref 14–44)
MCH RBC QN AUTO: 30.2 PG (ref 26.8–34.3)
MCHC RBC AUTO-ENTMCNC: 32.4 G/DL (ref 31.4–37.4)
MCV RBC AUTO: 93 FL (ref 82–98)
MONOCYTES # BLD AUTO: 0.43 THOUSAND/ΜL (ref 0.17–1.22)
MONOCYTES NFR BLD AUTO: 7 % (ref 4–12)
NEUTROPHILS # BLD AUTO: 4.19 THOUSANDS/ΜL (ref 1.85–7.62)
NEUTS SEG NFR BLD AUTO: 61 % (ref 43–75)
NRBC BLD AUTO-RTO: 0 /100 WBCS
PLATELET # BLD AUTO: 254 THOUSANDS/UL (ref 149–390)
PMV BLD AUTO: 9.5 FL (ref 8.9–12.7)
RBC # BLD AUTO: 3.81 MILLION/UL (ref 3.81–5.12)
WBC # BLD AUTO: 6.65 THOUSAND/UL (ref 4.31–10.16)

## 2019-03-18 PROCEDURE — 99225 PR SBSQ OBSERVATION CARE/DAY 25 MINUTES: CPT | Performed by: OBSTETRICS & GYNECOLOGY

## 2019-03-18 PROCEDURE — 84702 CHORIONIC GONADOTROPIN TEST: CPT | Performed by: OBSTETRICS & GYNECOLOGY

## 2019-03-18 PROCEDURE — 85025 COMPLETE CBC W/AUTO DIFF WBC: CPT | Performed by: OBSTETRICS & GYNECOLOGY

## 2019-03-18 RX ORDER — LANOLIN ALCOHOL/MO/W.PET/CERES
3 CREAM (GRAM) TOPICAL
Status: DISCONTINUED | OUTPATIENT
Start: 2019-03-18 | End: 2019-03-18

## 2019-03-18 RX ORDER — LANOLIN ALCOHOL/MO/W.PET/CERES
3 CREAM (GRAM) TOPICAL
Status: DISCONTINUED | OUTPATIENT
Start: 2019-03-18 | End: 2019-03-18 | Stop reason: HOSPADM

## 2019-03-18 RX ORDER — IBUPROFEN 600 MG/1
600 TABLET ORAL EVERY 6 HOURS PRN
Qty: 50 TABLET | Refills: 1 | Status: SHIPPED | OUTPATIENT
Start: 2019-03-18 | End: 2021-12-09

## 2019-03-18 RX ORDER — MISOPROSTOL 200 UG/1
800 TABLET ORAL ONCE
Status: COMPLETED | OUTPATIENT
Start: 2019-03-18 | End: 2019-03-18

## 2019-03-18 RX ADMIN — DIPHENHYDRAMINE HYDROCHLORIDE 25 MG: 50 INJECTION INTRAMUSCULAR; INTRAVENOUS at 00:05

## 2019-03-18 RX ADMIN — MELATONIN 3 MG: 3 TAB ORAL at 03:08

## 2019-03-18 RX ADMIN — OXYCODONE HYDROCHLORIDE AND ACETAMINOPHEN 1 TABLET: 5; 325 TABLET ORAL at 09:29

## 2019-03-18 RX ADMIN — SODIUM CHLORIDE, SODIUM LACTATE, POTASSIUM CHLORIDE, AND CALCIUM CHLORIDE 125 ML/HR: .6; .31; .03; .02 INJECTION, SOLUTION INTRAVENOUS at 02:15

## 2019-03-18 RX ADMIN — ACETAMINOPHEN 650 MG: 325 TABLET ORAL at 11:03

## 2019-03-18 RX ADMIN — MISOPROSTOL 800 MCG: 200 TABLET ORAL at 09:40

## 2019-03-18 RX ADMIN — HYDROMORPHONE HYDROCHLORIDE 1 MG: 1 INJECTION, SOLUTION INTRAMUSCULAR; INTRAVENOUS; SUBCUTANEOUS at 07:30

## 2019-03-18 NOTE — PLAN OF CARE
Problem: PAIN - ADULT  Goal: Verbalizes/displays adequate comfort level or baseline comfort level  Description  Interventions:  - Encourage patient to monitor pain and request assistance  - Assess pain using appropriate pain scale  - Administer analgesics based on type and severity of pain and evaluate response  - Implement non-pharmacological measures as appropriate and evaluate response  - Consider cultural and social influences on pain and pain management  - Notify physician/advanced practitioner if interventions unsuccessful or patient reports new pain  Outcome: Progressing     Problem: Knowledge Deficit  Goal: Patient/family/caregiver demonstrates understanding of disease process, treatment plan, medications, and discharge instructions  Description  Complete learning assessment and assess knowledge base    Interventions:  - Provide teaching at level of understanding  - Provide teaching via preferred learning methods  Outcome: Progressing     Problem: HEMATOLOGIC - ADULT  Goal: Maintains hematologic stability  Description  INTERVENTIONS  - Assess for signs and symptoms of bleeding or hemorrhage  - Monitor labs  - Administer supportive blood products/factors as ordered and appropriate  Outcome: Progressing

## 2019-03-18 NOTE — PROGRESS NOTES
Cytotec 800mcg MA administered  Advised patient to remain in bed x30m to allow pills to dissolve and absorb  After this time she may be discharged to home  She has already received Rhogam for Rh negative status on 3/16/19  Reviewed expected clinical course after cytotec, including bleeding which may be heavy for a few hours during active passage of tissue  Advised on return precautions  Will prescribe motrin 600mg PO tabs and provide work note for this week  Discussed that I recommend follow up in our office at the end of this week to repeat [address]  Patient agreeable to this      Sharron Arias MD  03/18/19  9:48 AM

## 2019-03-18 NOTE — UTILIZATION REVIEW
Initial Clinical Review    Admission: Date/Time/Statement:   OBS  ORDER   3/17  @  1854   Orders Placed This Encounter   Procedures    Place in Observation     Standing Status:   Standing     Number of Occurrences:   1     Order Specific Question:   Admitting Physician     Answer:   Tiffanie Quintero [[id number]]     Order Specific Question:   Level of Care     Answer:   Med Surg [16]     ED: Date/Time/Mode of Arrival:   ED Arrival Information     Expected Arrival Acuity Means of Arrival Escorted By Service Admission Type    - 3/17/2019 11:53 Urgent Wheelchair Self OB/GYN Urgent    Arrival Complaint    Vag bleed,poss micarrage        Chief Complaint:   Chief Complaint   Patient presents with    Vaginal Bleeding - Pregnant     Pt reports abd pain and vaginal bleeding that began yesterday, seen at [facility] for same  Today increased pain and bleeding  Pt has used 3 pads since 0600 this AM      Assessment/Plan: 29yo  at 6w6d by LMP with spontaneous  of IUP vs  ectopic  Single intrauterine pregnancy with a crown-rump length equivalent to a gestational age of 7 weeks 3 days  2   Absent embryonic cardiac activity and somewhat irregular contour of the gestational sac, findings consistent with nonviable pregnancy  3   The gestational sac is located low in the uterine cavity, adjacent to a  scar   Implantation at the scar cannot be excluded    Kaley Stack is a 29 y  o  at [address] per LMP 19 female who presents with vaginal bleeding and cramping  Patient presented to [facility] ED yesterday with similar symptoms  Bedside ultrasound showed positive IUP  Heartbeat difficult to visualize  She was discharged with precautions after she was given rhogam for Rh negative status (confirmed given at 1656)  Patient states that her vaginal bleeding started again this morning  She has been changing her pad once ever hour  Upon initial evaluation, her pain was 10/10 and generalized   Now, she has 0/10 pain after being given 4mg x 3 (total 12mg morphine) since initial eval for severe pain  Patient states she has been passing clots but no products of conception      Imaging showed 6w3d intrauterine pregnancy  However, the gestational sac is located low in the uterine cavity, adjacent to a  scar so implantation at the scar cannot be excluded  Due to risk of  ectopic, OBGYN was consulted       Brief OBGYN history:      Patient is a   This is her 3rd pregnancy  She had one  section in  secondary to arrest of dilation at [facility]  She then had an elective termination in   This is not a planned pregnancy  She was on the pill for contraception but is unsure what it is called  She has a history of irregular menses     1) IUP miscarriage vs  ectopic     With absent embryonic activity in a gestation with 6-7mm mean crown rump length at 6w3d and clinical history of vaginal bleeding and abdominal cramping, this is likely an intrauterine miscarriage  However, per radiology, a  ectopic pregnancy cannot be excluded as the gestational sac is located low in the uterine cavity  We discussed with patient both of these scenarios and need for further imaging for diagnosis  We discussed that if this is an IUP, we can manage expectantly, medically, or surgically  If this is a  ectopic, we will need to discuss management options including methotrexate vs surgical resection  Because there is a concern for  ectopic, we discussed with patient that she needs to stay in the hospital for observation and obtain MRI in the morning  Will keep NPO and type and cross 2U pRBC     - S/p rhogam 3/16  - F/u CMP and repeat CBC at   - F/u CBC and b-hcg at 0600 3/18  - Type and cross for 2U pRBC  Starting Hgb 14 9g/dL    - MRI to rule out  ectopic - talked to Dr Julienne Morris of radiology who said that they can complete MRI tomorrow morning   - Analgesia: tylenol, roxicodone, morphine prn     2) Diet: NPO   - Last meal was at 0100 this morning   - She has only been drinking water since then      3) DVT ppx: ambulation, SCDs         ED Vital Signs:   ED Triage Vitals   Temperature Pulse Respirations Blood Pressure SpO2   19 1158 19 1159 19 1159 19 1159 19 1159   (!) 97 1 °F (36 2 °C) 70 18 136/63 98 %      Temp Source Heart Rate Source Patient Position - Orthostatic VS BP Location FiO2 (%)   19 1158 19 1536 19 1159 19 1159 --   Temporal Monitor Sitting Right arm       Pain Score       19 1158       Worst Possible Pain        Wt Readings from Last 1 Encounters:   19 67 8 kg (149 lb 7 6 oz)     Vital Signs (abnormal):    above    Pertinent Labs/Diagnostic Test Results:    WBC   14 34  Abs neutro    11 68  platelts   148  Albumin  3 3  H/H   12 5/39    (  3/17)            11 5/35 5  (  3/18)  Trans  Vag  U/S:      1   Single intrauterine pregnancy with a crown-rump length equivalent to a gestational age of 7 weeks 3 days  2   Absent embryonic cardiac activity and somewhat irregular contour of the gestational sac, findings consistent with nonviable pregnancy  3   The gestational sac is located low in the uterine cavity, adjacent to a  scar   Implantation at the scar cannot be excluded      ED Treatment:   Medication Administration from 2019 1153 to 2019 1940       Date/Time Order Dose Route Action Action by Comments     2019 1434 sodium chloride 0 9 % bolus 1,000 mL 0 mL Intravenous Stopped Fifi Fowler RN      2019 1339 sodium chloride 0 9 % bolus 1,000 mL 1,000 mL Intravenous New Bag Martha Day RN      2019 1712 morphine (PF) 4 mg/mL injection 4 mg 4 mg Intravenous Given Fifi Fowler RN      2019 1435 morphine (PF) 4 mg/mL injection 4 mg 4 mg Intravenous Given Fifi Fowler RN      2019 1338 morphine (PF) 4 mg/mL injection 4 mg 4 mg Intravenous Given Martha Day RN      2019 1338 ondansetron (ZOFRAN) injection 4 mg 4 mg Intravenous Given Martha Day RN      2019 1801 sodium chloride 0 9 % bolus 1,000 mL 0 mL Intravenous Stopped Jennifer Vela RN      2019 1711 sodium chloride 0 9 % bolus 1,000 mL 1,000 mL Intravenous [address] Jennifer Vela RN      2019 1807 lactated ringers infusion 125 mL/hr Intravenous New Bag Jennifer Vela RN         Past Medical/Surgical History: Active Ambulatory Problems     Diagnosis Date Noted    No Active Ambulatory Problems     Resolved Ambulatory Problems     Diagnosis Date Noted    No Resolved Ambulatory Problems     No Additional Past Medical History     Admitting Diagnosis: Miscarriage [O03 9]  Vaginal bleeding [N93 9]     Age/Sex: 29 y o  female     Admission Orders:    OBS   3/17  @     7904  Scheduled Meds:   Current Facility-Administered Medications:  acetaminophen 650 mg Oral Q6H PRN Omer Chou MD    diphenhydrAMINE 25 mg Intravenous Q6H PRN Omer Chou MD    HYDROmorphone 1 mg Intravenous Q4H PRN Omer Chou MD    lactated ringers 125 mL/hr Intravenous Continuous Omer Chou MD Last Rate: 125 mL/hr (19)   melatonin 3 mg Oral HS Omer Chou MD    misoprostol 800 mcg Rectal Once Jesse Anna MD    oxyCODONE-acetaminophen 1 tablet Oral Q4H PRN Omer Chou MD      Continuous Infusions:   lactated ringers 125 mL/hr Last Rate: 125 mL/hr (19)     PRN Meds:   acetaminophen    diphenhydrAMINE    HYDROmorphone    oxyCODONE-acetaminophen     IV  Dilaudid  PRN (  x1  3/17    And  X 1  3/18  Thus far)  IV  MSO4  PRN  (  x3  3/17)    PROGRESS  NOTE   3/18  Jose Zuniga is a 29 y o   female at [facility] admitted for vaginal bleeding and downtrending quants secondary to SAB vs   ectopic     1   SAB vs  ectopic              abd exam and vitals are stable              Hgb 14  9 -> 12 5 -> 11 5 [facility] 4774 -> 4536 -> 6300 8              CMP Cr: 0 52, AST/ALT: 10/13              Typed and crossed 2units pRBC  2  Analgesia              -tylenol, roxicodone, dilaudid  3  Insomnia              -melatonin, benadryl  4  FEN              -NPO  No acute events overnight  She has mild cramping but otherwise pain is controlled  Last pain med (dilaudid) at 2153 last night  Pt is NPO currently  Pt is passing flatus  Pt is ambulating  When she voids, there are small clots in the toilet  Denies fevers/chills  She reports vaginal bleeding has improved at this time (same amount as a period)    Network Utilization Review Department  Phone: [phone]; Fax [phone]  [email or web address]  ATTENTION: Please call with any questions or concerns to [phone]  and carefully listen to the prompts so that you are directed to the right person  Send all requests for admission clinical reviews, approved or denied determinations and any other requests to fax [phone]   All voicemails are confidential

## 2019-03-18 NOTE — PROGRESS NOTES
Gynecology Progress note   Annie Bañuelos 29 y o  female MRN: [id number]  Unit/Bed#: E2 -01 Encounter: [id number]    A/P: Annie Bañuelos is a 29 y o   female at 7w8d admitted for vaginal bleeding and downtrending quants secondary to SAB vs   ectopic    1  SAB vs  ectopic   abd exam and vitals are stable   Hgb 14  9 -> 12 5 -> 11 5   bhcg 4774 -> 4536 -> 2460 8   CMP Cr: 0 52, AST/ALT: 10/13   Typed and crossed 2units pRBC  2  Analgesia   -tylenol, roxicodone, dilaudid  3  Insomnia   -melatonin, benadryl  4  FEN   -NPO  5  DVT ppx   -ambulation, SCDs     Subjective    No acute events overnight  She has mild cramping but otherwise pain is controlled  Last pain med (dilaudid) at 2153 last night  Pt is NPO currently  Pt is passing flatus  Pt is ambulating  When she voids, there are small clots in the toilet  Denies fevers/chills  She reports vaginal bleeding has improved at this time (same amount as a period)    Review of Systems   Constitutional: Negative for chills and fever  Eyes: Negative for visual disturbance  Respiratory: Negative for shortness of breath  Cardiovascular: Negative for chest pain  Gastrointestinal: Negative for constipation, diarrhea, nausea and vomiting  Cramping   Genitourinary: Positive for vaginal bleeding  Negative for pelvic pain, urgency, vaginal discharge and vaginal pain  Neurological: Negative for headaches  Objective    /74 (BP Location: Right arm)   Pulse 64   Temp (!) 97 2 °F (36 2 °C) (Tympanic)   Resp 20   Ht 4' 0 11" (1 222 m)   Wt 67 8 kg (149 lb 7 6 oz)   LMP 2019 (Approximate)   SpO2 97%   BMI 45 40 kg/m²     No intake/output data recorded    I/O this shift:  In: -   Out: 425 [Urine:425]    Lab Results   Component Value Date    WBC 6 65 2019    HGB 11 5 2019    HCT 35 5 2019    MCV 93 2019     2019       Lab Results   Component Value Date    CALCIUM 8 2 (L) 03/17/2019    K 3 9 03/17/2019    CO2 24 03/17/2019     03/17/2019    BUN 7 03/17/2019    CREATININE 0 52 (L) 03/17/2019       No results found for: POCGLU    Physical Exam   Constitutional: She is oriented to person, place, and time  She appears well-developed and well-nourished  Cardiovascular: Normal rate, regular rhythm and normal heart sounds  Pulmonary/Chest: Effort normal and breath sounds normal    Abdominal: Soft  Bowel sounds are normal  She exhibits no distension  There is tenderness  There is no rebound and no guarding  Gravid uterus   Genitourinary:   Genitourinary Comments: Some vaginal bleeding on the pad   Musculoskeletal: Normal range of motion  She exhibits no edema  Neurological: She is alert and oriented to person, place, and time  Skin: Skin is warm and dry  Psychiatric: She has a normal mood and affect   Her behavior is normal  Judgment and thought content normal        Spencer Jessica MD   OB/GYN, PGY-2  3/18/2019 6:30 AM

## 2019-03-18 NOTE — PROGRESS NOTES
Late note due to events on labor and delivery floor:    Evaluated patient at approximately 0130  Patient was lying comfortably in bed on her phone  She stated that her pain was a 0/10 after 1mg dilaudid was given to her around 2153  She denied abdominal pelvic pain, dizziness, nausea, vomiting, fever, chills, shortness of breath, chest pain       Vitals:    19 1902 19 2009 19 0004 19 0237   BP: 113/73 115/69 92/54 121/74   BP Location: Right arm Right arm Right arm Right arm   Pulse: 63 69 69 64   Resp: 20 20 20 20   Temp:  97 8 °F (36 6 °C) (!) 97 2 °F (36 2 °C)    TempSrc:  Tympanic Tympanic    SpO2: 99% 97% 97%    Weight:  67 8 kg (149 lb 7 6 oz)     Height:  4' 0 11" (1 222 m)       Physical exam:  Abdomen nontender to palpation, no guarding, no rebound tenderness, no referred pain, no rigidity    Labs:  CBC: Hgb 14 9 --> 12 5g/dL  CMP: Cr 0 52, AST/ALT 10/13  Type and crossed for 2U pRBC    Plan:  29yo  at 6w6d admitted for observation of SAB vs  ectopic     - Follow up AM CBC, bHCG  - Plan for MRI in AM  - Melatonin to help with sleep per pt request  - FEN: NPO, LR at 125cc/hr  - DVT ppx: bilateral SCDs in place    Wilfrido Barraza MD  OBGYN, PGY-2  3/18/2019  2:45 AM

## 2019-03-18 NOTE — DISCHARGE INSTRUCTIONS
Miscarriage   WHAT YOU NEED TO KNOW:   A miscarriage is the loss of a fetus within the first 20 weeks of pregnancy  A miscarriage may also be called a spontaneous  or an early pregnancy loss  DISCHARGE INSTRUCTIONS:   Seek care immediately if:   · You have foul-smelling drainage or pus coming from your vagina  · You have heavy vaginal bleeding and soak 1 pad or more in an hour  · You have severe abdominal pain  · You feel like your heart is beating faster than normal      · You feel extremely weak or dizzy  Contact your healthcare provider if:   · You have a fever greater than 100 4°F or chills  · You have extreme sadness, grief, or feel unable to cope with what has happened  · You have questions or concerns about your condition or care  Self-care:   · Do not put anything in your vagina for 2 weeks or as directed  Do not use tampons, douche, or have sex  These actions can cause infection and pain  · Use sanitary pads as needed  You may have light bleeding or spotting for 2 weeks  · Do not take a bath or go swimming for 2 weeks or as directed  These actions may increase your risk for an infection  Take showers only  · Rest as needed  Slowly start to do more each day  Return to your daily activities as directed  · Talk to your healthcare provider about birth control  If you would like to prevent another pregnancy, ask your healthcare provider which type of birth control is best for you  · Join a support group or therapy to help you cope  A miscarriage may be very difficult for you, your partner, and other members of your family  There is no right way to feel after a miscarriage  You may feel overwhelming grief or other emotions  It may be helpful to talk to a friend, family member, or counselor about your feelings  You may worry that you could have another miscarriage  Talk to your healthcare provider about your concerns   He may be able to help you reduce the risk for another miscarriage  He may also help you find ways to cope with grief  For more information:   · [employer] [facility]  [address] [place] [address] , [address] [place] [address]  Phone: [phone]  Phone: [phone]  Web Address: [email or web address]/  [employer]  · [facility]  [address] [facility] , [address] [facility]  Web Address: [employer]  Follow up with your healthcare provider as directed: You may need to see your healthcare provider for blood tests or an ultrasound  Write down your questions so you remember to ask them during your visits  © 2017 [employer] Information is for End User's use only and may not be sold, redistributed or otherwise used for commercial purposes  All illustrations and images included in CareNotes® are the copyrighted property of [employer]  or Jose Manuel Prado  The above information is an  only  It is not intended as medical advice for individual conditions or treatments  Talk to your doctor, nurse or pharmacist before following any medical regimen to see if it is safe and effective for you

## 2019-03-26 ENCOUNTER — OFFICE VISIT (OUTPATIENT)
Dept: OBGYN CLINIC | Facility: CLINIC | Age: 29
End: 2019-03-26

## 2019-03-26 VITALS
HEART RATE: 91 BPM | SYSTOLIC BLOOD PRESSURE: 124 MMHG | DIASTOLIC BLOOD PRESSURE: 84 MMHG | WEIGHT: 150 LBS | BODY MASS INDEX: 45.56 KG/M2

## 2019-03-26 DIAGNOSIS — F32.A DEPRESSION, UNSPECIFIED DEPRESSION TYPE: ICD-10-CM

## 2019-03-26 DIAGNOSIS — Z30.011 ENCOUNTER FOR INITIAL PRESCRIPTION OF CONTRACEPTIVE PILLS: Primary | ICD-10-CM

## 2019-03-26 DIAGNOSIS — O03.9 SAB (SPONTANEOUS ABORTION): ICD-10-CM

## 2019-03-26 PROBLEM — Z3A.01 LESS THAN 8 WEEKS GESTATION OF PREGNANCY: Status: RESOLVED | Noted: 2019-03-17 | Resolved: 2019-03-26

## 2019-03-26 PROCEDURE — 99214 OFFICE O/P EST MOD 30 MIN: CPT | Performed by: OBSTETRICS & GYNECOLOGY

## 2019-03-26 RX ORDER — NORGESTIMATE AND ETHINYL ESTRADIOL 0.25-0.035
1 KIT ORAL DAILY
Qty: 28 TABLET | Refills: 2 | Status: SHIPPED | OUTPATIENT
Start: 2019-03-26 | End: 2019-12-18 | Stop reason: SDUPTHER

## 2019-03-26 NOTE — ASSESSMENT & PLAN NOTE
- Sprintec cOCP prescribed   - Discussed proper compliance with birth control  - RTC in 3 months for birth control check

## 2019-03-26 NOTE — LETTER
March 26, 2019     Patient: Arpita Ashley   YOB: 1990   Date of Visit: 3/26/2019       To Whom it May Concern:    Arpita Ashley is under my professional care  She was seen in my office on 3/26/2019  She may return to work on 3/27 without restrictions       If you have any questions or concerns, please don't hesitate to call           Sincerely,          Jean Marie Phelan MD

## 2019-03-26 NOTE — PROGRESS NOTES
Subjective:     Aniceto Maddox is a 29 y o   female who presents after missed  treated with 800mg cytotec on 3/18 in [facility] ED  She has a history of  section in  and elective termination in   This was not a planned pregnancy and patient reports that was using OCPs at the time of conception and planned for termination of the pregnancy the following Saturday  Patient received Rhogam for her Rh negative status on 3/16  Patient reports that she had a few days of heavy bleeding and has since then just been spotting  She denies pelvic pain, fevers, chills  She reports that she has daily sadness and episodes of anexiety  She has difficulty sleeping which she remedies by smoking Marijuana  She reports that she is currently dieting but that she also has a decreased appetite  She reports that she was previously treated for depression but is not currently on any medication  She also reports that she struggled with postpartum depression after the birth of her son  Patient reports that she also had severe episodes of nausea and vomiting that she thinks this is associated with her anxiety  She is interested in starting medication today  She would like to start birth control  She has previously tried Nexplanon which she states made her acne worse  She would like to start something that can also help with her acne  This pregnancy was conceived on birth control pills but she endorses that she often skipped days taking her pills  She states that she is confident that she can be complaint with OCPs especially after this miscarriage  She denies history of migraines with aura or blood clots  She reports that she smokes approximately 1/2 pack of cigarettes daily and she is not interested in quitting at this time  Objective:    Vitals: Blood pressure 124/84, pulse 91, weight 68 kg (150 lb)  Body mass index is 45 56 kg/m²      TVUS: Small uterus without evidence of IUP or POC, nabothian cyst visualized, increased vascularity in pelvis    Assessment/Plan:    Problem List Items Addressed This Visit        Other    Encounter for initial prescription of contraceptive pills - Primary     - Sprintec cOCP prescribed   - Discussed proper compliance with birth control  - RTC in 3 months for birth control check         Relevant Medications    norgestimate-ethinyl estradiol (ORTHO-CYCLEN) 0 25-35 MG-MCG per tablet    Depression     - Sertraline 50mg prescribed  - Referral to social work for further coordination of care         Relevant Medications    sertraline (ZOLOFT) 50 mg tablet    Other Relevant Orders    Ambulatory referral to Social Work    RESOLVED: SAB (spontaneous )     - No evidence of IUP in uterus s/p cytotec  - Reviewed bleeding expectations                   Dee Miller MD  3/26/2019  11:12 AM

## 2019-03-26 NOTE — PATIENT INSTRUCTIONS
Depression   AMBULATORY CARE:   Depression  is a medical condition that causes feelings of sadness or hopelessness that do not go away  Depression may cause you to lose interest in things you used to enjoy  These feelings may interfere with your daily life  Common symptoms include the following:   · Appetite changes, or weight gain or loss    · Trouble going to sleep or staying asleep, or sleeping too much    · Fatigue or lack of energy    · Feeling restless, irritable, or withdrawn    · Feeling worthless, hopeless, discouraged, or guilty    · Trouble concentrating, remembering things, doing daily tasks, or making decisions    · Thoughts about hurting or killing yourself  Call 911 for any of the following:   · You think about harming yourself or someone else  Contact your healthcare provider if:   · Your symptoms do not improve  · You cannot make it to your next appointment  · You have new symptoms  · You have questions or concerns about your condition or care  Treatment for depression  may include medicine to improve or balance your mood  Therapy may also be used to treat your depression  A therapist will help you learn to cope with your thoughts and feelings  Therapy can be done alone or in a group  It may also be done with family members or a significant other  Self-care:   · Get regular physical activity  Try to exercise for 30 minutes, 3 to 5 days a week  Work with your healthcare provider to develop an exercise plan that you enjoy  Physical activity may improve your symptoms  · Get enough sleep  Create a routine to help you relax before bed  You can listen to music, read, or do yoga  Try to go to bed and wake up at the same time every day  Sleep is important for emotional health  · Eat a variety of healthy foods from all of the food groups  A healthy meal plan is low in fat, salt, and added sugar   Ask your healthcare provider for more information about a meal plan that is right for you      · Do not drink alcohol or use drugs  Alcohol and drugs can make your symptoms worse  Follow up with your healthcare provider as directed: Your healthcare provider will monitor your progress at follow-up visits  He or she will also monitor your medicine if you take antidepressants  Your healthcare provider will ask if the medicine is helping  Tell him or her about any side effects or problems you may have with your medicine  The type or amount of medicine may need to be changed  Write down your questions so you remember to ask them during your visits  © 2017 [employer] Information is for End User's use only and may not be sold, redistributed or otherwise used for commercial purposes  All illustrations and images included in CareNotes® are the copyrighted property of [employer] Inc  or Jose Manuel Prado  The above information is an  only  It is not intended as medical advice for individual conditions or treatments  Talk to your doctor, nurse or pharmacist before following any medical regimen to see if it is safe and effective for you

## 2019-05-24 DIAGNOSIS — Z78.9 NEED FOR FOLLOW-UP BY SOCIAL WORKER: Primary | ICD-10-CM

## 2019-05-29 ENCOUNTER — PATIENT OUTREACH (OUTPATIENT)
Dept: OBGYN CLINIC | Facility: CLINIC | Age: 29
End: 2019-05-29

## 2019-06-03 DIAGNOSIS — F32.A DEPRESSION, UNSPECIFIED DEPRESSION TYPE: ICD-10-CM

## 2019-06-05 ENCOUNTER — PATIENT OUTREACH (OUTPATIENT)
Dept: OBGYN CLINIC | Facility: CLINIC | Age: 29
End: 2019-06-05

## 2019-06-07 DIAGNOSIS — F32.A DEPRESSION, UNSPECIFIED DEPRESSION TYPE: ICD-10-CM

## 2019-12-17 ENCOUNTER — HOSPITAL ENCOUNTER (EMERGENCY)
Facility: HOSPITAL | Age: 29
Discharge: HOME | End: 2019-12-17
Attending: EMERGENCY MEDICINE | Admitting: EMERGENCY MEDICINE

## 2019-12-17 ENCOUNTER — APPOINTMENT (EMERGENCY)
Dept: ULTRASOUND IMAGING | Facility: HOSPITAL | Age: 29
End: 2019-12-17

## 2019-12-17 VITALS
SYSTOLIC BLOOD PRESSURE: 120 MMHG | BODY MASS INDEX: 42.79 KG/M2 | OXYGEN SATURATION: 99 % | DIASTOLIC BLOOD PRESSURE: 78 MMHG | HEART RATE: 88 BPM | WEIGHT: 140.87 LBS | TEMPERATURE: 98.8 F | RESPIRATION RATE: 16 BRPM

## 2019-12-17 DIAGNOSIS — O03.9 INEVITABLE COMPLETE MISCARRIAGE WITHOUT COMPLICATION: Primary | ICD-10-CM

## 2019-12-17 LAB
ABO GROUP BLD: NORMAL
ABO GROUP BLD: NORMAL
ANION GAP SERPL CALCULATED.3IONS-SCNC: 9 MMOL/L (ref 4–13)
BASOPHILS # BLD AUTO: 0.06 THOUSANDS/ΜL (ref 0–0.1)
BASOPHILS NFR BLD AUTO: 1 % (ref 0–1)
BILIRUB UR QL STRIP: NEGATIVE
BLD GP AB SCN SERPL QL: NEGATIVE
BUN SERPL-MCNC: 5 MG/DL (ref 5–25)
CALCIUM SERPL-MCNC: 8.9 MG/DL (ref 8.3–10.1)
CHLORIDE SERPL-SCNC: 103 MMOL/L (ref 100–108)
CLARITY UR: CLEAR
CO2 SERPL-SCNC: 26 MMOL/L (ref 21–32)
COLOR UR: YELLOW
CREAT SERPL-MCNC: 0.51 MG/DL (ref 0.6–1.3)
EOSINOPHIL # BLD AUTO: 0.16 THOUSAND/ΜL (ref 0–0.61)
EOSINOPHIL NFR BLD AUTO: 1 % (ref 0–6)
ERYTHROCYTE [DISTWIDTH] IN BLOOD BY AUTOMATED COUNT: 13.1 % (ref 11.6–15.1)
EXT PREG TEST URINE: POSITIVE
EXT. CONTROL ED NAV: ABNORMAL
GFR SERPL CREATININE-BSD FRML MDRD: 130 ML/MIN/1.73SQ M
GLUCOSE SERPL-MCNC: 95 MG/DL (ref 65–140)
GLUCOSE UR STRIP-MCNC: NEGATIVE MG/DL
HCT VFR BLD AUTO: 43.6 % (ref 34.8–46.1)
HGB BLD-MCNC: 14.3 G/DL (ref 11.5–15.4)
HGB UR QL STRIP.AUTO: NEGATIVE
IMM GRANULOCYTES # BLD AUTO: 0.04 THOUSAND/UL (ref 0–0.2)
IMM GRANULOCYTES NFR BLD AUTO: 0 % (ref 0–2)
KETONES UR STRIP-MCNC: NEGATIVE MG/DL
LEUKOCYTE ESTERASE UR QL STRIP: NEGATIVE
LYMPHOCYTES # BLD AUTO: 1.54 THOUSANDS/ΜL (ref 0.6–4.47)
LYMPHOCYTES NFR BLD AUTO: 14 % (ref 14–44)
MCH RBC QN AUTO: 30.6 PG (ref 26.8–34.3)
MCHC RBC AUTO-ENTMCNC: 32.8 G/DL (ref 31.4–37.4)
MCV RBC AUTO: 93 FL (ref 82–98)
MONOCYTES # BLD AUTO: 0.61 THOUSAND/ΜL (ref 0.17–1.22)
MONOCYTES NFR BLD AUTO: 5 % (ref 4–12)
NEUTROPHILS # BLD AUTO: 8.85 THOUSANDS/ΜL (ref 1.85–7.62)
NEUTS SEG NFR BLD AUTO: 79 % (ref 43–75)
NITRITE UR QL STRIP: NEGATIVE
NRBC BLD AUTO-RTO: 0 /100 WBCS
PH UR STRIP.AUTO: 7 [PH] (ref 4.5–8)
PLATELET # BLD AUTO: 359 THOUSANDS/UL (ref 149–390)
PMV BLD AUTO: 9.3 FL (ref 8.9–12.7)
POTASSIUM SERPL-SCNC: 3.8 MMOL/L (ref 3.5–5.3)
PROT UR STRIP-MCNC: NEGATIVE MG/DL
RBC # BLD AUTO: 4.67 MILLION/UL (ref 3.81–5.12)
RH BLD: NEGATIVE
RH BLD: NEGATIVE
SODIUM SERPL-SCNC: 138 MMOL/L (ref 136–145)
SP GR UR STRIP.AUTO: 1.01 (ref 1–1.03)
UROBILINOGEN UR QL STRIP.AUTO: 0.2 E.U./DL
WBC # BLD AUTO: 11.26 THOUSAND/UL (ref 4.31–10.16)

## 2019-12-17 PROCEDURE — 86900 BLOOD TYPING SEROLOGIC ABO: CPT | Performed by: EMERGENCY MEDICINE

## 2019-12-17 PROCEDURE — 86850 RBC ANTIBODY SCREEN: CPT | Performed by: EMERGENCY MEDICINE

## 2019-12-17 PROCEDURE — 99284 EMERGENCY DEPT VISIT MOD MDM: CPT

## 2019-12-17 PROCEDURE — 85025 COMPLETE CBC W/AUTO DIFF WBC: CPT | Performed by: EMERGENCY MEDICINE

## 2019-12-17 PROCEDURE — 87086 URINE CULTURE/COLONY COUNT: CPT

## 2019-12-17 PROCEDURE — 96372 THER/PROPH/DIAG INJ SC/IM: CPT

## 2019-12-17 PROCEDURE — 86901 BLOOD TYPING SEROLOGIC RH(D): CPT | Performed by: EMERGENCY MEDICINE

## 2019-12-17 PROCEDURE — 99285 EMERGENCY DEPT VISIT HI MDM: CPT | Performed by: EMERGENCY MEDICINE

## 2019-12-17 PROCEDURE — 36415 COLL VENOUS BLD VENIPUNCTURE: CPT | Performed by: EMERGENCY MEDICINE

## 2019-12-17 PROCEDURE — 81003 URINALYSIS AUTO W/O SCOPE: CPT

## 2019-12-17 PROCEDURE — 81025 URINE PREGNANCY TEST: CPT | Performed by: EMERGENCY MEDICINE

## 2019-12-17 PROCEDURE — 76801 OB US < 14 WKS SINGLE FETUS: CPT

## 2019-12-17 PROCEDURE — 80048 BASIC METABOLIC PNL TOTAL CA: CPT | Performed by: EMERGENCY MEDICINE

## 2019-12-17 PROCEDURE — 96360 HYDRATION IV INFUSION INIT: CPT

## 2019-12-17 RX ORDER — IBUPROFEN 600 MG/1
600 TABLET ORAL EVERY 6 HOURS PRN
Qty: 30 TABLET | Refills: 0 | Status: SHIPPED | OUTPATIENT
Start: 2019-12-17 | End: 2021-01-11

## 2019-12-17 RX ORDER — ONDANSETRON 4 MG/1
4 TABLET, ORALLY DISINTEGRATING ORAL EVERY 8 HOURS PRN
Qty: 12 TABLET | Refills: 0 | Status: SHIPPED | OUTPATIENT
Start: 2019-12-17 | End: 2021-01-11

## 2019-12-17 RX ADMIN — SODIUM CHLORIDE 1000 ML: 0.9 INJECTION, SOLUTION INTRAVENOUS at 09:40

## 2019-12-17 RX ADMIN — HUMAN RHO(D) IMMUNE GLOBULIN 300 MCG: 300 INJECTION, SOLUTION INTRAMUSCULAR at 14:01

## 2019-12-17 NOTE — DISCHARGE INSTRUCTIONS
[address] [facility] today to schedule a follow up appointment in a week for re-evaluation and possible medications

## 2019-12-17 NOTE — ED PROVIDER NOTES
History  Chief Complaint   Patient presents with    Abdominal Pain Pregnant     Pt reports positive home pregnancy test 2 weeks ago  Pt reports lower abdominal pain and back pain with vaginal bleeding x 1 week  Pt reports   DLMP 10/20 - two positive pregnancy tests at home and positive here in department  [id number], 8wk 2d by dates  Hx of previous elective and subsequent spontaneous abortions - last in 2019  Started w/ some spotting of brownish discharge about a week ago w/ lower abd pain and cramping that has been gradually worsening  Now w/ 4/10 constant lower abd pain w/ increased pain w/ palpation or movement  Denies any sig breast tenderness, notes nausea, no vomiting  Some increased urination and constipation  Denies dysuria  History provided by:  Patient and medical records   used: No    Vaginal Discharge   Quality:  Shila Bautista  Severity:  Mild  Onset quality:  Gradual  Duration:  1 week  Timing:  Constant  Progression:  Worsening  Chronicity:  New  Context: during pregnancy    Relieved by:  None tried  Worsened by:  Nothing  Ineffective treatments:  None tried  Associated symptoms: abdominal pain, nausea and urinary frequency    Associated symptoms: no dysuria, no fever, no urinary hesitancy, no urinary incontinence, no vaginal itching and no vomiting    Risk factors: no new sexual partner        Prior to Admission Medications   Prescriptions Last Dose Informant Patient Reported?  Taking?   ibuprofen (MOTRIN) 600 mg tablet   No No   Sig: Take 1 tablet (600 mg total) by mouth every 6 (six) hours as needed (cramping)   norgestimate-ethinyl estradiol (ORTHO-CYCLEN) 0 25-35 MG-MCG per tablet   No No   Sig: Take 1 tablet by mouth daily   sertraline (ZOLOFT) 50 mg tablet   No No   Sig: TAKE 1 TABLET BY MOUTH EVERY DAY   sertraline (ZOLOFT) 50 mg tablet   No No   Sig: Take 1 tablet (50 mg total) by mouth daily      Facility-Administered Medications: None       History reviewed  No pertinent past medical history  Past Surgical History:   Procedure Laterality Date    BREAST IMPLANT       SECTION      1       History reviewed  No pertinent family history  I have reviewed and agree with the history as documented  Social History     Tobacco Use    Smoking status: Current Some Day Smoker     Packs/day: 0 50    Smokeless tobacco: Current User   Substance Use Topics    Alcohol use: Yes     Comment: occasional    Drug use: Yes     Types: Marijuana     Comment: few times per week        Review of Systems   Constitutional: Negative for fever  Gastrointestinal: Positive for abdominal pain and nausea  Negative for vomiting  Genitourinary: Positive for vaginal discharge  Negative for bladder incontinence, dysuria and hesitancy  All other systems reviewed and are negative  Physical Exam  Physical Exam   Constitutional: She appears well-developed and well-nourished  HENT:   Nose: Nose normal    Mouth/Throat: Oropharynx is clear and moist    Eyes: Conjunctivae are normal    Neck: Neck supple  Cardiovascular: Normal rate and regular rhythm  Pulmonary/Chest: Effort normal and breath sounds normal    Abdominal: Soft  There is tenderness in the right lower quadrant, suprapubic area and left lower quadrant  There is no rigidity, no rebound and no guarding  Musculoskeletal: She exhibits no deformity  Neurological: She is alert  Skin: Skin is warm  Psychiatric: She has a normal mood and affect  Nursing note and vitals reviewed        Vital Signs  ED Triage Vitals [19 0844]   Temperature Pulse Respirations Blood Pressure SpO2   98 8 °F (37 1 °C) (!) 107 16 124/83 99 %      Temp Source Heart Rate Source Patient Position - Orthostatic VS BP Location FiO2 (%)   Oral Monitor -- Right arm --      Pain Score       4           Vitals:    19 0844 19 1045 19 1404   BP: 124/83 118/80 120/78   Pulse: (!) 107 90 88         Visual Acuity      ED Medications  Medications   sodium chloride 0 9 % bolus 1,000 mL (0 mL Intravenous Stopped 12/17/19 1057)   Rho(D) immune globulin (RHOGAM ULTRA-FILTERED PLUS) IM injection 300 mcg (300 mcg Intramuscular Given 12/17/19 1401)       Diagnostic Studies  Results Reviewed     Procedure Component Value Units Date/Time    Basic metabolic panel [[id number]]  (Abnormal) Collected:  12/17/19 0939    Lab Status:  Final result Specimen:  Blood from Arm, Right Updated:  12/17/19 0952     Sodium 138 mmol/L      Potassium 3 8 mmol/L      Chloride 103 mmol/L      CO2 26 mmol/L      ANION GAP 9 mmol/L      BUN 5 mg/dL      Creatinine 0 51 mg/dL      Glucose 95 mg/dL      Calcium 8 9 mg/dL      eGFR 130 ml/min/1 73sq m     Narrative:       [place] guidelines for Chronic Kidney Disease (CKD):     Stage 1 with normal or high GFR (GFR > 90 mL/min/1 73 square meters)    Stage 2 Mild CKD (GFR = 60-89 mL/min/1 73 square meters)    Stage 3A Moderate CKD (GFR = 45-59 mL/min/1 73 square meters)    Stage 3B Moderate CKD (GFR = 30-44 mL/min/1 73 square meters)    Stage 4 Severe CKD (GFR = 15-29 mL/min/1 73 square meters)    Stage 5 End Stage CKD (GFR <15 mL/min/1 73 square meters)  Note: GFR calculation is accurate only with a steady state creatinine    CBC and differential [[id number]]  (Abnormal) Collected:  12/17/19 0939    Lab Status:  Final result Specimen:  Blood from Arm, Right Updated:  12/17/19 0945     WBC 11 26 Thousand/uL      RBC 4 67 Million/uL      Hemoglobin 14 3 g/dL      Hematocrit 43 6 %      MCV 93 fL      MCH 30 6 pg      MCHC 32 8 g/dL      RDW 13 1 %      MPV 9 3 fL      Platelets 653 Thousands/uL      nRBC 0 /100 WBCs      Neutrophils Relative 79 %      Immat GRANS % 0 %      Lymphocytes Relative 14 %      Monocytes Relative 5 %      Eosinophils Relative 1 %      Basophils Relative 1 %      Neutrophils Absolute 8 85 Thousands/µL      Immature Grans Absolute 0 04 Thousand/uL      Lymphocytes Absolute 1 54 Thousands/µL      Monocytes Absolute 0 61 Thousand/µL      Eosinophils Absolute 0 16 Thousand/µL      Basophils Absolute 0 06 Thousands/µL     Urine culture [[id number]] Collected:  12/17/19 0913    Lab Status: In process Specimen:  Urine, Clean Catch Updated:  12/17/19 0918    POCT pregnancy, urine [[id number]]  (Abnormal) Resulted:  12/17/19 0916    Lab Status:  Final result Updated:  12/17/19 0916     EXT PREG TEST UR (Ref: Negative) Positive     Control Valid    POCT urinalysis dipstick [[id number]]  (Abnormal) Resulted:  12/17/19 0916    Lab Status:  Final result Specimen:  Urine, Other Updated:  12/17/19 0916    Urine Macroscopic, POC [[id number]] Collected:  12/17/19 0913    Lab Status:  Final result Specimen:  Urine Updated:  12/17/19 0914     Color, UA Yellow     Clarity, UA Clear     pH, UA 7 0     Leukocytes, UA Negative     Nitrite, UA Negative     Protein, UA Negative mg/dl      Glucose, UA Negative mg/dl      Ketones, UA Negative mg/dl      Urobilinogen, UA 0 2 E U /dl      Bilirubin, UA Negative     Blood, UA Negative     Specific Gravity, UA 1 015    Narrative:       CLINITEK RESULT                 US OB < 14 weeks single or first gestation level 1   ED Interpretation by Raul Rome DO (12/17 1201)   See below      Final Result by Vickey Elena MD (12/17 1143)      Findings consistent with fetal demise at 7 weeks 3 days  Abnormal gestational sac shape due to a small anterior subchorionic hemorrhage measuring 1 9 x 0 6 cm along with internal debris within the sac  Workstation performed: [id number]                    Procedures  Procedures         ED Course  ED Course as of Dec 17 2157   Tue Dec 17, 2019   1204 D/w pt US results  Needed a 'rectal medicine' last time to pass POC    Since Os closed, will d/w GYN (follows w/ [facility]) - paged on call      [phone] No answer - paged again      [phone] Spoke with Dr Emely Botello, tell pt to f/u w/ office - will get an appointment within a week for re-evaluation and possible medication administration  [facility]  Number of Diagnoses or Management Options  Inevitable complete miscarriage without complication: new and requires workup     Amount and/or Complexity of Data Reviewed  Clinical lab tests: reviewed and ordered  Tests in the radiology section of CPT®: ordered and reviewed  Discuss the patient with other providers: yes  Independent visualization of images, tracings, or specimens: yes          Disposition  Final diagnoses:   Inevitable complete miscarriage without complication     Time reflects when diagnosis was documented in both MDM as applicable and the Disposition within this note     Time User Action Codes Description Comment    12/17/2019  1:52 PM Cathy Kelley Add [O03 9] Inevitable complete miscarriage without complication       ED Disposition     ED Disposition Condition Date/Time Comment    Discharge Stable Tue Dec 17, 2019  1:52 PM Ericka Watson discharge to home/self care  Follow-up Information     Follow up With Specialties Details Why Nic Esposito 1998, DO Obstetrics and Gynecology, Obstetrics, Gynecology Schedule an appointment as soon as possible for a visit in 1 week  [address] [place] [address]  [phone]            Discharge Medication List as of 12/17/2019  2:06 PM      START taking these medications    Details   !! ibuprofen (MOTRIN) 600 mg tablet Take 1 tablet (600 mg total) by mouth every 6 (six) hours as needed for moderate pain, Starting Tue 12/17/2019, Normal      ondansetron (ZOFRAN-ODT) 4 mg disintegrating tablet Take 1 tablet (4 mg total) by mouth every 8 (eight) hours as needed for nausea or vomiting, Starting Tue 12/17/2019, Normal       !! - Potential duplicate medications found  Please discuss with provider        CONTINUE these medications which have NOT CHANGED    Details   !! ibuprofen (MOTRIN) 600 mg tablet Take 1 tablet (600 mg total) by mouth every 6 (six) hours as needed (cramping), Starting Mon 3/18/2019, Print      norgestimate-ethinyl estradiol (ORTHO-CYCLEN) 0 25-35 MG-MCG per tablet Take 1 tablet by mouth daily, Starting Tue 3/26/2019, Normal      !! sertraline (ZOLOFT) 50 mg tablet TAKE 1 TABLET BY MOUTH EVERY DAY, Normal      !! sertraline (ZOLOFT) 50 mg tablet Take 1 tablet (50 mg total) by mouth daily, Starting Fri 6/7/2019, Normal       !! - Potential duplicate medications found  Please discuss with provider  No discharge procedures on file      ED Provider  Electronically Signed by           Jorje Motta DO  12/17/19 2208

## 2019-12-18 ENCOUNTER — PROCEDURE VISIT (OUTPATIENT)
Dept: OBGYN CLINIC | Facility: CLINIC | Age: 29
End: 2019-12-18

## 2019-12-18 VITALS
SYSTOLIC BLOOD PRESSURE: 110 MMHG | BODY MASS INDEX: 33.42 KG/M2 | DIASTOLIC BLOOD PRESSURE: 60 MMHG | WEIGHT: 144.4 LBS | HEIGHT: 55 IN

## 2019-12-18 DIAGNOSIS — Z30.011 ENCOUNTER FOR INITIAL PRESCRIPTION OF CONTRACEPTIVE PILLS: ICD-10-CM

## 2019-12-18 DIAGNOSIS — O03.9 MISCARRIAGE: Primary | ICD-10-CM

## 2019-12-18 LAB — BACTERIA UR CULT: NORMAL

## 2019-12-18 PROCEDURE — 99213 OFFICE O/P EST LOW 20 MIN: CPT | Performed by: OBSTETRICS & GYNECOLOGY

## 2019-12-18 PROCEDURE — S0191 MISOPROSTOL, ORAL, 200 MCG: HCPCS

## 2019-12-18 RX ORDER — ONDANSETRON 4 MG/1
4 TABLET, ORALLY DISINTEGRATING ORAL EVERY 6 HOURS PRN
Qty: 20 TABLET | Refills: 1 | Status: SHIPPED | OUTPATIENT
Start: 2019-12-18 | End: 2021-01-11

## 2019-12-18 RX ORDER — MISOPROSTOL 200 UG/1
600 TABLET ORAL ONCE
Status: COMPLETED | OUTPATIENT
Start: 2019-12-18 | End: 2019-12-18

## 2019-12-18 RX ORDER — NORGESTIMATE AND ETHINYL ESTRADIOL 0.25-0.035
1 KIT ORAL DAILY
Qty: 28 TABLET | Refills: 11 | Status: SHIPPED | OUTPATIENT
Start: 2019-12-18 | End: 2021-01-11

## 2019-12-18 RX ORDER — IBUPROFEN 600 MG/1
600 TABLET ORAL EVERY 6 HOURS PRN
Qty: 30 TABLET | Refills: 1 | Status: SHIPPED | OUTPATIENT
Start: 2019-12-18 | End: 2019-12-21

## 2019-12-18 RX ADMIN — MISOPROSTOL 200 MCG: 200 TABLET ORAL at 11:34

## 2019-12-18 NOTE — PROGRESS NOTES
Assessment/Plan:     No problem-specific Assessment & Plan notes found for this encounter  Diagnoses and all orders for this visit:    Miscarriage  -     ibuprofen (MOTRIN) 600 mg tablet; Take 1 tablet (600 mg total) by mouth every 6 (six) hours as needed for mild pain for up to 3 days  -     ondansetron (ZOFRAN-ODT) 4 mg disintegrating tablet; Take 1 tablet (4 mg total) by mouth every 6 (six) hours as needed for nausea or vomiting  -     misoprostol (CYTOTEC) tablet 600 mcg    Encounter for initial prescription of contraceptive pills  -     norgestimate-ethinyl estradiol (ORTHO-CYCLEN) 0 25-35 MG-MCG per tablet; Take 1 tablet by mouth daily      RTO in one week for follow up  Received Rhogam           Subjective:      Patient ID: Edyth Nageotte is a 34 y o  female presents as a follow up to a miscarriage  See US below  Today the patient is doing well  She has had a miscarriage in the past and passed it with cytotec  She wishes to do this again  She does not desire future pregnancies; she wishes to use OCPs for now once this episode is resolved  HPI    The following portions of the patient's history were reviewed and updated as appropriate: allergies, current medications, past family history, past medical history, past social history, past surgical history and problem list     Review of Systems      Objective:      /60   Ht 4' 0 11" (1 222 m)   Wt 65 5 kg (144 lb 6 4 oz)   LMP 10/28/2018 (Approximate)   BMI 43 86 kg/m²     A single intrauterine gestation is identified  Cardiac activity could not be visualized      YOLK SAC:  Present and normal in size and appearance  MEAN GESTATIONAL SAC SIZE: 28 mm = 7 weeks 5 days   MEAN CROWN RUMP LENGTH:  13 mm = 7 weeks 3 days   FETAL ANATOMY:  Appropriate for gestational age  AMNIOTIC FLUID/SAC SHAPE:  Slight indentation anteriorly by a subchorionic bleed  Minimal internal debris within the sac    PLACENTA:  The placenta is appropriate for gestational age      Subchorionic hemorrhage anterior to the sac measuring 1 9 x 0 6 cm      UTERUS/ADNEXA:   The uterus and left ovary are within normal limits  Probable corpus luteum in the right ovary  The cervix remains closed  No free fluid present      IMPRESSION:     Findings consistent with fetal demise at 7 weeks 3 days      Abnormal gestational sac shape due to a small anterior subchorionic hemorrhage measuring 1 9 x 0 6 cm along with internal debris within the sac  Physical Exam   Constitutional: She is oriented to person, place, and time  She appears well-developed and well-nourished  No distress  Pulmonary/Chest: Effort normal    Genitourinary: Vagina normal  There is no rash, tenderness, lesion or injury on the right labia  There is no rash, tenderness, lesion or injury on the left labia  Genitourinary Comments: Cytotec 600 mg placed PV  Neurological: She is alert and oriented to person, place, and time  Psychiatric: She has a normal mood and affect  Her behavior is normal    Nursing note and vitals reviewed

## 2020-10-27 ENCOUNTER — APPOINTMENT (EMERGENCY)
Dept: ULTRASOUND IMAGING | Facility: HOSPITAL | Age: 30
End: 2020-10-27
Payer: COMMERCIAL

## 2020-10-27 ENCOUNTER — HOSPITAL ENCOUNTER (EMERGENCY)
Facility: HOSPITAL | Age: 30
Discharge: HOME | End: 2020-10-27
Attending: EMERGENCY MEDICINE | Admitting: EMERGENCY MEDICINE
Payer: COMMERCIAL

## 2020-10-27 VITALS
WEIGHT: 161.6 LBS | HEART RATE: 71 BPM | SYSTOLIC BLOOD PRESSURE: 105 MMHG | BODY MASS INDEX: 49.09 KG/M2 | OXYGEN SATURATION: 100 % | DIASTOLIC BLOOD PRESSURE: 77 MMHG | TEMPERATURE: 98 F | RESPIRATION RATE: 17 BRPM

## 2020-10-27 DIAGNOSIS — O46.90 VAGINAL BLEEDING IN PREGNANCY: Primary | ICD-10-CM

## 2020-10-27 DIAGNOSIS — O36.1190 ABO INCOMPATIBILITY IN PREGNANCY: ICD-10-CM

## 2020-10-27 DIAGNOSIS — O02.1 IUFD AT LESS THAN 20 WEEKS OF GESTATION: ICD-10-CM

## 2020-10-27 LAB
ABO GROUP BLD: NORMAL
ANION GAP SERPL CALCULATED.3IONS-SCNC: 8 MMOL/L (ref 4–13)
B-HCG SERPL-ACNC: 1109.4 MIU/ML
BACTERIA UR QL AUTO: ABNORMAL /HPF
BASOPHILS # BLD AUTO: 0.04 THOUSANDS/ΜL (ref 0–0.1)
BASOPHILS NFR BLD AUTO: 0 % (ref 0–1)
BILIRUB UR QL STRIP: ABNORMAL
BUN SERPL-MCNC: 6 MG/DL (ref 5–25)
CALCIUM SERPL-MCNC: 9.4 MG/DL (ref 8.3–10.1)
CHLORIDE SERPL-SCNC: 102 MMOL/L (ref 100–108)
CLARITY UR: ABNORMAL
CO2 SERPL-SCNC: 26 MMOL/L (ref 21–32)
COLOR UR: ABNORMAL
CREAT SERPL-MCNC: 0.5 MG/DL (ref 0.6–1.3)
EOSINOPHIL # BLD AUTO: 0.23 THOUSAND/ΜL (ref 0–0.61)
EOSINOPHIL NFR BLD AUTO: 3 % (ref 0–6)
ERYTHROCYTE [DISTWIDTH] IN BLOOD BY AUTOMATED COUNT: 13.3 % (ref 11.6–15.1)
EXT PREG TEST URINE: POSITIVE
EXT. CONTROL ED NAV: ABNORMAL
GFR SERPL CREATININE-BSD FRML MDRD: 131 ML/MIN/1.73SQ M
GLUCOSE SERPL-MCNC: 86 MG/DL (ref 65–140)
GLUCOSE UR STRIP-MCNC: NEGATIVE MG/DL
HCT VFR BLD AUTO: 43.1 % (ref 34.8–46.1)
HGB BLD-MCNC: 14.2 G/DL (ref 11.5–15.4)
HGB UR QL STRIP.AUTO: ABNORMAL
IMM GRANULOCYTES # BLD AUTO: 0.05 THOUSAND/UL (ref 0–0.2)
IMM GRANULOCYTES NFR BLD AUTO: 1 % (ref 0–2)
KETONES UR STRIP-MCNC: ABNORMAL MG/DL
LEUKOCYTE ESTERASE UR QL STRIP: NEGATIVE
LYMPHOCYTES # BLD AUTO: 2.15 THOUSANDS/ΜL (ref 0.6–4.47)
LYMPHOCYTES NFR BLD AUTO: 23 % (ref 14–44)
MCH RBC QN AUTO: 30.3 PG (ref 26.8–34.3)
MCHC RBC AUTO-ENTMCNC: 32.9 G/DL (ref 31.4–37.4)
MCV RBC AUTO: 92 FL (ref 82–98)
MONOCYTES # BLD AUTO: 0.51 THOUSAND/ΜL (ref 0.17–1.22)
MONOCYTES NFR BLD AUTO: 6 % (ref 4–12)
NEUTROPHILS # BLD AUTO: 6.33 THOUSANDS/ΜL (ref 1.85–7.62)
NEUTS SEG NFR BLD AUTO: 67 % (ref 43–75)
NITRITE UR QL STRIP: NEGATIVE
NON-SQ EPI CELLS URNS QL MICRO: ABNORMAL /HPF
NRBC BLD AUTO-RTO: 0 /100 WBCS
PH UR STRIP.AUTO: 5 [PH]
PLATELET # BLD AUTO: 356 THOUSANDS/UL (ref 149–390)
PMV BLD AUTO: 9.2 FL (ref 8.9–12.7)
POTASSIUM SERPL-SCNC: 3.4 MMOL/L (ref 3.5–5.3)
PROT UR STRIP-MCNC: ABNORMAL MG/DL
RBC # BLD AUTO: 4.69 MILLION/UL (ref 3.81–5.12)
RBC #/AREA URNS AUTO: ABNORMAL /HPF
RH BLD: NEGATIVE
SODIUM SERPL-SCNC: 136 MMOL/L (ref 136–145)
SP GR UR STRIP.AUTO: 1.02 (ref 1–1.03)
UROBILINOGEN UR QL STRIP.AUTO: 0.2 E.U./DL
WBC # BLD AUTO: 9.31 THOUSAND/UL (ref 4.31–10.16)
WBC #/AREA URNS AUTO: ABNORMAL /HPF

## 2020-10-27 PROCEDURE — 86900 BLOOD TYPING SEROLOGIC ABO: CPT | Performed by: EMERGENCY MEDICINE

## 2020-10-27 PROCEDURE — 86901 BLOOD TYPING SEROLOGIC RH(D): CPT | Performed by: EMERGENCY MEDICINE

## 2020-10-27 PROCEDURE — 76801 OB US < 14 WKS SINGLE FETUS: CPT

## 2020-10-27 PROCEDURE — 87086 URINE CULTURE/COLONY COUNT: CPT | Performed by: EMERGENCY MEDICINE

## 2020-10-27 PROCEDURE — 81001 URINALYSIS AUTO W/SCOPE: CPT | Performed by: EMERGENCY MEDICINE

## 2020-10-27 PROCEDURE — 99284 EMERGENCY DEPT VISIT MOD MDM: CPT

## 2020-10-27 PROCEDURE — 80048 BASIC METABOLIC PNL TOTAL CA: CPT | Performed by: EMERGENCY MEDICINE

## 2020-10-27 PROCEDURE — 87591 N.GONORRHOEAE DNA AMP PROB: CPT | Performed by: EMERGENCY MEDICINE

## 2020-10-27 PROCEDURE — 36415 COLL VENOUS BLD VENIPUNCTURE: CPT | Performed by: EMERGENCY MEDICINE

## 2020-10-27 PROCEDURE — 87491 CHLMYD TRACH DNA AMP PROBE: CPT | Performed by: EMERGENCY MEDICINE

## 2020-10-27 PROCEDURE — 85025 COMPLETE CBC W/AUTO DIFF WBC: CPT | Performed by: EMERGENCY MEDICINE

## 2020-10-27 PROCEDURE — 84702 CHORIONIC GONADOTROPIN TEST: CPT | Performed by: EMERGENCY MEDICINE

## 2020-10-27 PROCEDURE — 81025 URINE PREGNANCY TEST: CPT | Performed by: EMERGENCY MEDICINE

## 2020-10-27 PROCEDURE — 99284 EMERGENCY DEPT VISIT MOD MDM: CPT | Performed by: EMERGENCY MEDICINE

## 2020-10-27 RX ORDER — ACETAMINOPHEN 325 MG/1
650 TABLET ORAL ONCE
Status: COMPLETED | OUTPATIENT
Start: 2020-10-27 | End: 2020-10-27

## 2020-10-27 RX ADMIN — ACETAMINOPHEN 650 MG: 325 TABLET ORAL at 17:56

## 2020-10-28 LAB
BACTERIA UR CULT: NORMAL
C TRACH DNA SPEC QL NAA+PROBE: NEGATIVE
N GONORRHOEA DNA SPEC QL NAA+PROBE: NEGATIVE

## 2020-11-13 ENCOUNTER — NURSE TRIAGE (OUTPATIENT)
Dept: OTHER | Facility: OTHER | Age: 30
End: 2020-11-13

## 2020-11-13 DIAGNOSIS — Z20.828 EXPOSURE TO SARS-ASSOCIATED CORONAVIRUS: Primary | ICD-10-CM

## 2020-11-13 DIAGNOSIS — Z20.828 EXPOSURE TO SARS-ASSOCIATED CORONAVIRUS: ICD-10-CM

## 2020-11-13 PROCEDURE — U0003 INFECTIOUS AGENT DETECTION BY NUCLEIC ACID (DNA OR RNA); SEVERE ACUTE RESPIRATORY SYNDROME CORONAVIRUS 2 (SARS-COV-2) (CORONAVIRUS DISEASE [COVID-19]), AMPLIFIED PROBE TECHNIQUE, MAKING USE OF HIGH THROUGHPUT TECHNOLOGIES AS DESCRIBED BY CMS-2020-01-R: HCPCS | Performed by: FAMILY MEDICINE

## 2020-11-15 LAB — SARS-COV-2 RNA SPEC QL NAA+PROBE: NOT DETECTED

## 2021-01-06 ENCOUNTER — HOSPITAL ENCOUNTER (EMERGENCY)
Facility: HOSPITAL | Age: 31
Discharge: HOME | End: 2021-01-06
Attending: EMERGENCY MEDICINE

## 2021-01-06 ENCOUNTER — APPOINTMENT (EMERGENCY)
Dept: ULTRASOUND IMAGING | Facility: HOSPITAL | Age: 31
End: 2021-01-06

## 2021-01-06 VITALS
TEMPERATURE: 98.5 F | RESPIRATION RATE: 16 BRPM | HEART RATE: 68 BPM | OXYGEN SATURATION: 100 % | DIASTOLIC BLOOD PRESSURE: 80 MMHG | SYSTOLIC BLOOD PRESSURE: 116 MMHG

## 2021-01-06 DIAGNOSIS — R93.89 THICKENED ENDOMETRIUM: ICD-10-CM

## 2021-01-06 DIAGNOSIS — N93.9 VAGINAL BLEEDING: Primary | ICD-10-CM

## 2021-01-06 DIAGNOSIS — N39.0 UTI (URINARY TRACT INFECTION): ICD-10-CM

## 2021-01-06 LAB
ALBUMIN SERPL BCP-MCNC: 3.5 G/DL (ref 3.5–5)
ALP SERPL-CCNC: 79 U/L (ref 46–116)
ALT SERPL W P-5'-P-CCNC: 15 U/L (ref 12–78)
ANION GAP SERPL CALCULATED.3IONS-SCNC: 10 MMOL/L (ref 4–13)
AST SERPL W P-5'-P-CCNC: 19 U/L (ref 5–45)
B-HCG SERPL-ACNC: <2 MIU/ML
BACTERIA UR QL AUTO: ABNORMAL /HPF
BASOPHILS # BLD AUTO: 0.05 THOUSANDS/ΜL (ref 0–0.1)
BASOPHILS NFR BLD AUTO: 0 % (ref 0–1)
BILIRUB SERPL-MCNC: 0.27 MG/DL (ref 0.2–1)
BILIRUB UR QL STRIP: NEGATIVE
BUN SERPL-MCNC: 11 MG/DL (ref 5–25)
CALCIUM SERPL-MCNC: 9.4 MG/DL (ref 8.3–10.1)
CHLORIDE SERPL-SCNC: 103 MMOL/L (ref 100–108)
CLARITY UR: CLEAR
CO2 SERPL-SCNC: 26 MMOL/L (ref 21–32)
COLOR UR: ABNORMAL
CREAT SERPL-MCNC: 0.56 MG/DL (ref 0.6–1.3)
EOSINOPHIL # BLD AUTO: 0.36 THOUSAND/ΜL (ref 0–0.61)
EOSINOPHIL NFR BLD AUTO: 3 % (ref 0–6)
ERYTHROCYTE [DISTWIDTH] IN BLOOD BY AUTOMATED COUNT: 13.2 % (ref 11.6–15.1)
EXT PREG TEST URINE: NORMAL
EXT. CONTROL ED NAV: NORMAL
GFR SERPL CREATININE-BSD FRML MDRD: 126 ML/MIN/1.73SQ M
GLUCOSE SERPL-MCNC: 99 MG/DL (ref 65–140)
GLUCOSE UR STRIP-MCNC: NEGATIVE MG/DL
HCT VFR BLD AUTO: 40.6 % (ref 34.8–46.1)
HGB BLD-MCNC: 13 G/DL (ref 11.5–15.4)
HGB UR QL STRIP.AUTO: ABNORMAL
IMM GRANULOCYTES # BLD AUTO: 0.09 THOUSAND/UL (ref 0–0.2)
IMM GRANULOCYTES NFR BLD AUTO: 1 % (ref 0–2)
KETONES UR STRIP-MCNC: NEGATIVE MG/DL
LEUKOCYTE ESTERASE UR QL STRIP: ABNORMAL
LYMPHOCYTES # BLD AUTO: 1.24 THOUSANDS/ΜL (ref 0.6–4.47)
LYMPHOCYTES NFR BLD AUTO: 11 % (ref 14–44)
MCH RBC QN AUTO: 30.2 PG (ref 26.8–34.3)
MCHC RBC AUTO-ENTMCNC: 32 G/DL (ref 31.4–37.4)
MCV RBC AUTO: 94 FL (ref 82–98)
MONOCYTES # BLD AUTO: 0.59 THOUSAND/ΜL (ref 0.17–1.22)
MONOCYTES NFR BLD AUTO: 5 % (ref 4–12)
NEUTROPHILS # BLD AUTO: 9.48 THOUSANDS/ΜL (ref 1.85–7.62)
NEUTS SEG NFR BLD AUTO: 80 % (ref 43–75)
NITRITE UR QL STRIP: NEGATIVE
NON-SQ EPI CELLS URNS QL MICRO: ABNORMAL /HPF
NRBC BLD AUTO-RTO: 0 /100 WBCS
PH UR STRIP.AUTO: 6 [PH] (ref 4.5–8)
PLATELET # BLD AUTO: 357 THOUSANDS/UL (ref 149–390)
PMV BLD AUTO: 9.2 FL (ref 8.9–12.7)
POTASSIUM SERPL-SCNC: 4.8 MMOL/L (ref 3.5–5.3)
PROT SERPL-MCNC: 7.8 G/DL (ref 6.4–8.2)
PROT UR STRIP-MCNC: ABNORMAL MG/DL
RBC # BLD AUTO: 4.3 MILLION/UL (ref 3.81–5.12)
RBC #/AREA URNS AUTO: ABNORMAL /HPF
SODIUM SERPL-SCNC: 139 MMOL/L (ref 136–145)
SP GR UR STRIP.AUTO: >=1.03 (ref 1–1.03)
UROBILINOGEN UR QL STRIP.AUTO: 0.2 E.U./DL
WBC # BLD AUTO: 11.81 THOUSAND/UL (ref 4.31–10.16)
WBC #/AREA URNS AUTO: ABNORMAL /HPF

## 2021-01-06 PROCEDURE — 87086 URINE CULTURE/COLONY COUNT: CPT

## 2021-01-06 PROCEDURE — 81001 URINALYSIS AUTO W/SCOPE: CPT

## 2021-01-06 PROCEDURE — 76830 TRANSVAGINAL US NON-OB: CPT

## 2021-01-06 PROCEDURE — 81025 URINE PREGNANCY TEST: CPT

## 2021-01-06 PROCEDURE — 76856 US EXAM PELVIC COMPLETE: CPT

## 2021-01-06 PROCEDURE — 96375 TX/PRO/DX INJ NEW DRUG ADDON: CPT

## 2021-01-06 PROCEDURE — 84702 CHORIONIC GONADOTROPIN TEST: CPT | Performed by: PHYSICIAN ASSISTANT

## 2021-01-06 PROCEDURE — 87186 SC STD MICRODIL/AGAR DIL: CPT

## 2021-01-06 PROCEDURE — 85025 COMPLETE CBC W/AUTO DIFF WBC: CPT | Performed by: PHYSICIAN ASSISTANT

## 2021-01-06 PROCEDURE — 99284 EMERGENCY DEPT VISIT MOD MDM: CPT

## 2021-01-06 PROCEDURE — 80053 COMPREHEN METABOLIC PANEL: CPT | Performed by: PHYSICIAN ASSISTANT

## 2021-01-06 PROCEDURE — 87077 CULTURE AEROBIC IDENTIFY: CPT

## 2021-01-06 PROCEDURE — 36415 COLL VENOUS BLD VENIPUNCTURE: CPT | Performed by: PHYSICIAN ASSISTANT

## 2021-01-06 PROCEDURE — 96374 THER/PROPH/DIAG INJ IV PUSH: CPT

## 2021-01-06 PROCEDURE — 99285 EMERGENCY DEPT VISIT HI MDM: CPT | Performed by: PHYSICIAN ASSISTANT

## 2021-01-06 RX ORDER — IBUPROFEN 800 MG/1
800 TABLET ORAL EVERY 6 HOURS PRN
Qty: 30 TABLET | Refills: 0 | Status: SHIPPED | OUTPATIENT
Start: 2021-01-06 | End: 2021-01-11

## 2021-01-06 RX ORDER — ACETAMINOPHEN 325 MG/1
650 TABLET ORAL ONCE
Status: COMPLETED | OUTPATIENT
Start: 2021-01-06 | End: 2021-01-06

## 2021-01-06 RX ORDER — MORPHINE SULFATE 4 MG/ML
4 INJECTION, SOLUTION INTRAMUSCULAR; INTRAVENOUS ONCE
Status: COMPLETED | OUTPATIENT
Start: 2021-01-06 | End: 2021-01-06

## 2021-01-06 RX ORDER — KETOROLAC TROMETHAMINE 30 MG/ML
15 INJECTION, SOLUTION INTRAMUSCULAR; INTRAVENOUS ONCE
Status: COMPLETED | OUTPATIENT
Start: 2021-01-06 | End: 2021-01-06

## 2021-01-06 RX ORDER — CEPHALEXIN 500 MG/1
500 CAPSULE ORAL 2 TIMES DAILY
Qty: 20 CAPSULE | Refills: 0 | Status: SHIPPED | OUTPATIENT
Start: 2021-01-06 | End: 2021-01-16

## 2021-01-06 RX ADMIN — MORPHINE SULFATE 4 MG: 4 INJECTION INTRAVENOUS at 10:09

## 2021-01-06 RX ADMIN — ACETAMINOPHEN 650 MG: 325 TABLET ORAL at 12:10

## 2021-01-06 RX ADMIN — KETOROLAC TROMETHAMINE 15 MG: 30 INJECTION, SOLUTION INTRAMUSCULAR at 08:17

## 2021-01-06 NOTE — DISCHARGE INSTRUCTIONS
DISCHARGE INSTRUCTIONS:    FOLLOW UP WITH YOUR PRIMARY CARE PROVIDER OR THE [address] [place] [address]  MAKE AN APPOINTMENT TO BE SEEN  TAKE MEDICATION AS PRESCRIBED  IF RASH, SHORTNESS OF BREATH OR TROUBLE SWALLOWING, STOP TAKING THE MEDICATION AND BE SEEN  REST AND DRINK PLENTY OF FLUIDS  FOLLOW UP WITH THE RECOMMENDED OBGYN  IF SYMPTOMS WORSEN OR NEW SYMPTOMS ARISE, RETURN TO THE ER TO BE SEEN

## 2021-01-06 NOTE — ED PROVIDER NOTES
History  Chief Complaint   Patient presents with    Abdominal Pain     diffuse abd pain x 2 days  pt reports heavy menstral cycle that has lasted three weeks  Pt describes pain as " labor pain"  Pt reports miscarriage a month ago       30y  o female with no significant PMH presents to the ER for lower abdominal pain and vaginal bleeding for 3 weeks worsening within the last 2 days  Patient has been taking Motrin for pain  She describes her pain as cramping and radiating from her abdomen to her low back  Pain is constant  Patient states she had a miscarriage 3-4 weeks ago  She did not follow up  Patient states the bleeding did stop after the miscarriage but began again and she has been bleeding since  She denies fever, chills, URI symptoms, chest pain, dyspnea, N/V/D, hematuria, dysuria, frequency, urgency, weakness or paresthesias  History provided by:  Patient   used: No        Prior to Admission Medications   Prescriptions Last Dose Informant Patient Reported?  Taking?   ibuprofen (MOTRIN) 600 mg tablet   No No   Sig: Take 1 tablet (600 mg total) by mouth every 6 (six) hours as needed (cramping)   Patient not taking: Reported on 10/27/2020   ibuprofen (MOTRIN) 600 mg tablet   No No   Sig: Take 1 tablet (600 mg total) by mouth every 6 (six) hours as needed for moderate pain   Patient not taking: Reported on 10/27/2020   norgestimate-ethinyl estradiol (ORTHO-CYCLEN) 0 25-35 MG-MCG per tablet   No No   Sig: Take 1 tablet by mouth daily   Patient not taking: Reported on 10/27/2020   ondansetron (ZOFRAN-ODT) 4 mg disintegrating tablet   No No   Sig: Take 1 tablet (4 mg total) by mouth every 8 (eight) hours as needed for nausea or vomiting   Patient not taking: Reported on 12/18/2019   ondansetron (ZOFRAN-ODT) 4 mg disintegrating tablet   No No   Sig: Take 1 tablet (4 mg total) by mouth every 6 (six) hours as needed for nausea or vomiting   Patient not taking: Reported on 10/27/2020   sertraline (ZOLOFT) 50 mg tablet   No No   Sig: TAKE 1 TABLET BY MOUTH EVERY DAY   Patient not taking: Reported on 10/27/2020   sertraline (ZOLOFT) 50 mg tablet   No No   Sig: Take 1 tablet (50 mg total) by mouth daily   Patient not taking: Reported on 2019      Facility-Administered Medications: None       No past medical history on file  Past Surgical History:   Procedure Laterality Date    BREAST IMPLANT       SECTION      1       No family history on file  I have reviewed and agree with the history as documented  E-Cigarette/Vaping     E-Cigarette/Vaping Substances     Social History     Tobacco Use    Smoking status: Current Some Day Smoker     Packs/day: 0 50    Smokeless tobacco: Current User   Substance Use Topics    Alcohol use: Yes     Comment: occasional    Drug use: Not Currently     Types: Marijuana     Comment: few times per week       Review of Systems   Constitutional: Negative for activity change, appetite change, chills and fever  HENT: Negative for congestion, drooling, ear discharge, ear pain, facial swelling, rhinorrhea and sore throat  Eyes: Negative for redness  Respiratory: Negative for cough and shortness of breath  Cardiovascular: Negative for chest pain  Gastrointestinal: Positive for abdominal pain  Negative for diarrhea, nausea and vomiting  Genitourinary: Positive for vaginal bleeding  Negative for dysuria, flank pain, frequency, hematuria and urgency  Musculoskeletal: Negative for neck stiffness  Skin: Negative for rash  Allergic/Immunologic: Negative for food allergies  Neurological: Negative for weakness and numbness  Physical Exam  Physical Exam  Vitals signs and nursing note reviewed  Exam conducted with a chaperone present Sigrid Harding ED technician)  Constitutional:       General: She is not in acute distress  Appearance: She is not toxic-appearing  HENT:      Head: Normocephalic and atraumatic     Eyes:      Conjunctiva/sclera: Conjunctivae normal    Neck:      Musculoskeletal: Normal range of motion and neck supple  Trachea: No tracheal deviation  Cardiovascular:      Rate and Rhythm: Normal rate and regular rhythm  Pulmonary:      Effort: Pulmonary effort is normal  No respiratory distress  Abdominal:      General: Bowel sounds are normal  There is no distension  Palpations: Abdomen is soft  Tenderness: There is abdominal tenderness in the suprapubic area and left lower quadrant  There is no right CVA tenderness, left CVA tenderness, guarding or rebound  Genitourinary:     Exam position: Supine  Pubic Area: No rash  Labia:         Right: No rash, tenderness, lesion or injury  Left: No rash, tenderness, lesion or injury  Urethra: No prolapse, urethral pain, urethral swelling or urethral lesion  Vagina: No signs of injury and foreign body  Bleeding (mild) present  No vaginal discharge, erythema, tenderness, lesions or prolapsed vaginal walls  Cervix: No friability or erythema  Skin:     General: Skin is warm and dry  Findings: No rash  Neurological:      Mental Status: She is alert  GCS: GCS eye subscore is 4  GCS verbal subscore is 5  GCS motor subscore is 6           Vital Signs  ED Triage Vitals   Temperature Pulse Respirations Blood Pressure SpO2   01/06/21 0733 01/06/21 0733 01/06/21 0733 01/06/21 0733 01/06/21 0733   98 5 °F (36 9 °C) 91 18 148/78 100 %      Temp Source Heart Rate Source Patient Position - Orthostatic VS BP Location FiO2 (%)   01/06/21 0733 01/06/21 0733 01/06/21 0733 01/06/21 0733 --   Temporal Monitor Sitting Right arm       Pain Score       01/06/21 1008       7           Vitals:    01/06/21 1145 01/06/21 1200 01/06/21 1245 01/06/21 1300   BP: 116/80      Pulse: 74 68 72 68   Patient Position - Orthostatic VS:             Visual Acuity  Visual Acuity      Most Recent Value   L Pupil Size (mm)  3   R Pupil Size (mm)  3          ED Medications  Medications   ketorolac (TORADOL) injection 15 mg (15 mg Intravenous Given 1/6/21 0817)   morphine (PF) 4 mg/mL injection 4 mg (4 mg Intravenous Given 1/6/21 1009)   acetaminophen (TYLENOL) tablet 650 mg (650 mg Oral Given 1/6/21 1210)       Diagnostic Studies  Results Reviewed     Procedure Component Value Units Date/Time    Chlamydia/GC amplified DNA by PCR [[id number]] Collected: 01/06/21 1343    Lab Status: No result Specimen: Urine, Other     Comprehensive metabolic panel [[id number]]  (Abnormal) Collected: 01/06/21 0814    Lab Status: Final result Specimen: Blood from Arm, Right Updated: 01/06/21 0857     Sodium 139 mmol/L      Potassium 4 8 mmol/L      Chloride 103 mmol/L      CO2 26 mmol/L      ANION GAP 10 mmol/L      BUN 11 mg/dL      Creatinine 0 56 mg/dL      Glucose 99 mg/dL      Calcium 9 4 mg/dL      AST 19 U/L      ALT 15 U/L      Alkaline Phosphatase 79 U/L      Total Protein 7 8 g/dL      Albumin 3 5 g/dL      Total Bilirubin 0 27 mg/dL      eGFR 126 ml/min/1 73sq m     Narrative:      [place] guidelines for Chronic Kidney Disease (CKD):     Stage 1 with normal or high GFR (GFR > 90 mL/min/1 73 square meters)    Stage 2 Mild CKD (GFR = 60-89 mL/min/1 73 square meters)    Stage 3A Moderate CKD (GFR = 45-59 mL/min/1 73 square meters)    Stage 3B Moderate CKD (GFR = 30-44 mL/min/1 73 square meters)    Stage 4 Severe CKD (GFR = 15-29 mL/min/1 73 square meters)    Stage 5 End Stage CKD (GFR <15 mL/min/1 73 square meters)  Note: GFR calculation is accurate only with a steady state creatinine    Pregnancy, hCG, quantitative [[id number]]  (Normal) Collected: 01/06/21 0814    Lab Status: Final result Specimen: Blood from Arm, Right Updated: 01/06/21 0857     HCG, Quant <2 mIU/mL     Narrative:       Expected Ranges:     Approximate               Approximate HCG  Gestation age          Concentration ( mIU/mL)  _____________          ______________________   Gretchen Harris HCG values  0 2-1                       5-50  1-2                           2-3                         100-5000  3-4                         500-83416  4-5                         1000-39097  5-6                         [id number]  6-8                         [id number]  8-12                        33968-770297      Urine Microscopic [[id number]]  (Abnormal) Collected: 01/06/21 0743    Lab Status: Final result Specimen: Urine, Clean Catch Updated: 01/06/21 0843     RBC, UA Innumerable /hpf      WBC, UA Innumerable /hpf      Epithelial Cells Moderate /hpf      Bacteria, UA Occasional /hpf     Urine culture [[id number]] Collected: 01/06/21 0743    Lab Status:  In process Specimen: Urine, Clean Catch Updated: 01/06/21 0843    CBC and differential [[id number]]  (Abnormal) Collected: 01/06/21 0814    Lab Status: Final result Specimen: Blood from Arm, Right Updated: 01/06/21 0821     WBC 11 81 Thousand/uL      RBC 4 30 Million/uL      Hemoglobin 13 0 g/dL      Hematocrit 40 6 %      MCV 94 fL      MCH 30 2 pg      MCHC 32 0 g/dL      RDW 13 2 %      MPV 9 2 fL      Platelets 844 Thousands/uL      nRBC 0 /100 WBCs      Neutrophils Relative 80 %      Immat GRANS % 1 %      Lymphocytes Relative 11 %      Monocytes Relative 5 %      Eosinophils Relative 3 %      Basophils Relative 0 %      Neutrophils Absolute 9 48 Thousands/µL      Immature Grans Absolute 0 09 Thousand/uL      Lymphocytes Absolute 1 24 Thousands/µL      Monocytes Absolute 0 59 Thousand/µL      Eosinophils Absolute 0 36 Thousand/µL      Basophils Absolute 0 05 Thousands/µL     POCT pregnancy, urine [[id number]]  (Normal) Resulted: 01/06/21 0746    Lab Status: Final result Updated: 01/06/21 0746     EXT PREG TEST UR (Ref: Negative) neg     Control valid    POCT urinalysis dipstick [[id number]]  (Abnormal) Resulted: 01/06/21 0746    Lab Status: Final result Updated: 01/06/21 0746    Urine Macroscopic, POC [[id number]]  (Abnormal) Collected: 01/06/21 0743    Lab Status: Final result Specimen: Urine Updated: 01/06/21 0745     Color, UA Bloody     Clarity, UA Clear     pH, UA 6 0     Leukocytes, UA Large     Nitrite, UA Negative     Protein,  (2+) mg/dl      Glucose, UA Negative mg/dl      Ketones, UA Negative mg/dl      Urobilinogen, UA 0 2 E U /dl      Bilirubin, UA Negative     Blood, UA Large     Specific Gravity, UA >=1 030    Narrative:      CLINITEK RESULT                 US pelvis complete w transvaginal   Final Result by María Austin MD (01/06 0920)       Markedly thickened heterogeneous endometrium within the lower uterine segment measuring 2 7 cm  Workstation performed: [id number]                    Procedures  Procedures         ED Course  ED Course as of Jan 06 1427   Wed Jan 06, 2021   1027 [place] text sent to [facility]                                SBIRT 22yo+      Most Recent Value   SBIRT (25 yo +)   In order to provide better care to our patients, we are screening all of our patients for alcohol and drug use  Would it be okay to ask you these screening questions? No Filed at: 01/06/2021 4935   Initial Alcohol Screen: US AUDIT-C    1  How often do you have a drink containing alcohol?  0 Filed at: 01/06/2021 0752   3a  Male UNDER 65: How often do you have five or more drinks on one occasion? 0 Filed at: 01/06/2021 0752   3b  FEMALE Any Age, or MALE 65+: How often do you have 4 or more drinks on one occassion? 0 Filed at: 01/06/2021 0752   Audit-C Score  0 Filed at: 01/06/2021 5089                    MDM  Number of Diagnoses or Management Options  Thickened endometrium: new and requires workup  UTI (urinary tract infection): new and requires workup  Vaginal bleeding: new and requires workup  Diagnosis management comments: DDX consists of but not limited to: infection, retained products, pregnancy, miscarriage, ectopic    Will check labs and imaging   Will give Toradol for pain as long as pregnancy test is negative  Urine shows signs of infection  Patient requesting more pain medication  Patient states Toradol did not help much  Will give Morphine  US shows markedly thick endometrium in lower segment of uterus  Will speak with OB about patient's case  [place] text sent to [facility] resident  Still no response from [facility] resident  Will tiger text OB attending  Spoke with Dr Graves  from [facility]  Will do a pelvic exam  Will also check a gc/chlamydia  If mild bleeding, patient can be discharged home with outpatient follow up with OB and pain medication  Bleeding very minimal  Will discharge with outpatient follow up and medication  Patient agreeable to plan  The management plan was discussed in detail with the patient at bedside and all questions were answered  Prior to discharge, we provided both verbal and written instructions  We discussed with the patient the signs and symptoms for which to return to the emergency department  All questions were answered and patient was comfortable with the plan of care and discharged to home  Instructed the patient to follow up with the primary care provider and/or specialist provided and their written instructions  The patient verbalized understanding of our discussion and plan of care, and agrees to return to the Emergency Department for concerns and progression of illness  At discharge, I instructed the patient to:  -follow up with pcp  -follow up with OBGYN  -take medication as prescribed for pain and UTI  -rest and drink plenty of fluids  -return to the ER if symptoms worsened or new symptoms arose  Patient agreed to this plan and was stable at time of discharge         Amount and/or Complexity of Data Reviewed  Clinical lab tests: ordered and reviewed  Tests in the radiology section of CPT®: ordered and reviewed  Discuss the patient with other providers: yes    Patient Progress  Patient progress: stable      Disposition  Final diagnoses:   Vaginal bleeding   UTI (urinary tract infection)   Thickened endometrium     Time reflects when diagnosis was documented in both MDM as applicable and the Disposition within this note     Time User Action Codes Description Comment    1/6/2021  1:14 PM Sabi WAGGONER Add [N93 9] Vaginal bleeding     1/6/2021  1:14 PM Sabi WAGGONER Add [N39 0] UTI (urinary tract infection)     1/6/2021  1:14 PM Sabi WAGGONER Add [R93 89] Thickened endometrium       ED Disposition     ED Disposition Condition Date/Time Comment    Discharge Stable Wed Jan 6, 2021  1:13 PM Pierce Ibarra discharge to home/self care  Follow-up Information     Follow up With Specialties Details Why Contact Info Additional [address] Family Medicine Schedule an appointment as soon as possible for a visit  Follow up with OBGYN [address], [address] [facility] [id number]  [address], [address], [address] [place], [place], [address] [facility] [address]          Discharge Medication List as of 1/6/2021  1:16 PM      START taking these medications    Details   cephalexin (KEFLEX) 500 mg capsule Take 1 capsule (500 mg total) by mouth 2 (two) times a day for 10 days, Starting Wed 1/6/2021, Until Sat 1/16/2021, Normal      !! ibuprofen (MOTRIN) 800 mg tablet Take 1 tablet (800 mg total) by mouth every 6 (six) hours as needed for mild pain, Starting Wed 1/6/2021, Normal       !! - Potential duplicate medications found  Please discuss with provider        CONTINUE these medications which have NOT CHANGED    Details   !! ibuprofen (MOTRIN) 600 mg tablet Take 1 tablet (600 mg total) by mouth every 6 (six) hours as needed (cramping), Starting Mon 3/18/2019, Print      !! ibuprofen (MOTRIN) 600 mg tablet Take 1 tablet (600 mg total) by mouth every 6 (six) hours as needed for moderate pain, Starting Tue 12/17/2019, Normal      norgestimate-ethinyl estradiol (ORTHO-CYCLEN) 0 25-35 MG-MCG per tablet Take 1 tablet by mouth daily, Starting Wed 12/18/2019, Normal      !! ondansetron (ZOFRAN-ODT) 4 mg disintegrating tablet Take 1 tablet (4 mg total) by mouth every 8 (eight) hours as needed for nausea or vomiting, Starting Tue 12/17/2019, Normal      !! ondansetron (ZOFRAN-ODT) 4 mg disintegrating tablet Take 1 tablet (4 mg total) by mouth every 6 (six) hours as needed for nausea or vomiting, Starting Wed 12/18/2019, Normal      !! sertraline (ZOLOFT) 50 mg tablet TAKE 1 TABLET BY MOUTH EVERY DAY, Normal      !! sertraline (ZOLOFT) 50 mg tablet Take 1 tablet (50 mg total) by mouth daily, Starting Fri 6/7/2019, Normal       !! - Potential duplicate medications found  Please discuss with provider  No discharge procedures on file      PDMP Review     None          ED Provider  Electronically Signed by           Marisa Varela PA-C  01/06/21 5187

## 2021-01-06 NOTE — ED NOTES
Patient transported to [address] [place] [address] [place]       Julian Bond RN  01/06/21 6194

## 2021-01-08 ENCOUNTER — TELEPHONE (OUTPATIENT)
Dept: OBGYN CLINIC | Facility: CLINIC | Age: 31
End: 2021-01-08

## 2021-01-08 LAB
BACTERIA UR CULT: ABNORMAL
BACTERIA UR CULT: ABNORMAL

## 2021-01-08 NOTE — PROGRESS NOTES
Assessment/Plan:      Diagnoses and all orders for this visit:    Encounter for initial prescription of contraceptive pills  -     levonorgestrel-ethinyl estradiol (AVIANE,ALESSE,LESSINA) 0 1-20 MG-MCG per tablet; Take 1 tablet by mouth daily      -all forms of contraception reviewed including LARCs   -patient would like to start OCPs  given 6 weeks since last intercourse and bleeding reviewed quick start method  Common side effects including nausea, irregular bleeding and breast tenderness reviewed  ACHES reviewed  Written information provided  Missed pill protocol reviewed  -encouraged safe sexual practices including consistent condom use  -signs and symptoms to report reviewed  -encouraged patient to give 2-3 months for OCPs to help regulate menses  Reviewed with patient prolonged bleeding is most likely just related to miscarriage/stress   -guilt dispelled  Denies SI/ HI  Patient is agreeable to phone call from social Work  Has met with therapist in the past with good results  RTO 1 month for annual exam     Subjective:     Patient ID: Magdy Angelo is a 27 y o  female  HPI  here for follow-up after emergency department visit  Emergency department visit on 21 with complaints of heavy menses x 3 weeks and abdominal pain x 2 days  Menarche at age [age]  Typically menses occur every monthly  lasting 7 days  Had 1 menstrual cycle since miscarriage in 10/2020 lasting 4 weeks  Bleeding heavy/moderate/light  Heavy  for 3 weeks necessitating changing pad/ tampon about 6 times per day  Denies lightheadedness, fatigue, headache and dizziness  CBC normal on 21  No bleeding today and abdominal pain has resolved  Would like to start OCP today  Last IC - 6 weeks ago  Admits to guilt surrounding recent miscarriage  Medical history is significant for depression and anxiety  Is a some day cigarette smoker      Last Pap-unsure    Review of Systems   Constitutional: Negative for chills and unexpected weight change  Respiratory: Negative  Cardiovascular: Negative  Genitourinary: Positive for menstrual problem  Negative for difficulty urinating, dyspareunia, dysuria, flank pain, frequency, genital sores, vaginal bleeding, vaginal discharge and vaginal pain  Neurological: Negative for headaches  Objective:     Physical Exam  Constitutional:       Appearance: Normal appearance  Cardiovascular:      Rate and Rhythm: Normal rate and regular rhythm  Pulmonary:      Effort: Pulmonary effort is normal       Breath sounds: Normal breath sounds  Neurological:      Mental Status: She is alert and oriented to person, place, and time     Psychiatric:         Mood and Affect: Mood normal          Behavior: Behavior normal

## 2021-01-08 NOTE — TELEPHONE ENCOUNTER

## 2021-01-11 ENCOUNTER — OFFICE VISIT (OUTPATIENT)
Dept: OBGYN CLINIC | Facility: CLINIC | Age: 31
End: 2021-01-11

## 2021-01-11 VITALS
SYSTOLIC BLOOD PRESSURE: 133 MMHG | BODY MASS INDEX: 32.64 KG/M2 | HEIGHT: 59 IN | HEART RATE: 98 BPM | DIASTOLIC BLOOD PRESSURE: 94 MMHG

## 2021-01-11 DIAGNOSIS — Z30.011 ENCOUNTER FOR INITIAL PRESCRIPTION OF CONTRACEPTIVE PILLS: Primary | ICD-10-CM

## 2021-01-11 DIAGNOSIS — F32.A ANXIETY AND DEPRESSION: ICD-10-CM

## 2021-01-11 DIAGNOSIS — F41.9 ANXIETY AND DEPRESSION: ICD-10-CM

## 2021-01-11 PROCEDURE — 99213 OFFICE O/P EST LOW 20 MIN: CPT | Performed by: NURSE PRACTITIONER

## 2021-01-11 RX ORDER — LEVONORGESTREL AND ETHINYL ESTRADIOL 0.1-0.02MG
1 KIT ORAL DAILY
Qty: 30 TABLET | Refills: 3 | Status: SHIPPED | OUTPATIENT
Start: 2021-01-11 | End: 2021-06-08

## 2021-01-11 NOTE — PATIENT INSTRUCTIONS
Birth Control Pills   WHAT YOU NEED TO KNOW:   What are birth control pills? Birth control pills are also called oral contraceptives, or the pill  It is medicine that helps prevent pregnancy by stopping ovulation  Ovulation is when the ovaries make and release an egg cell each month  If this egg gets fertilized by sperm, pregnancy occurs  You will need to take the pill at the same time every day  Your healthcare provider will tell you when to start taking the pill  You will also be told what to do if you miss a dose  Instructions will depend on the kind of birth control pills you are taking  What are the different kinds of birth control pills? Some kinds are taken for 21 days in a row, followed by 7 days of placebo (no hormones) pills  Other kinds are taken for 24 days followed by 4 days of placebos  Each kind has a certain amount of female hormones  Your provider will decide on the kind that is best for you based on your age and other health conditions  What may be done before I can start taking birth control pills? You need to see your healthcare provider to get a prescription  Any of the following may be done before your healthcare provider gives you a prescription:  · Your healthcare provider will ask about diseases and illnesses you have had in the past  Your provider will check your risk for blood clots, heart conditions, or stroke  Tell your provider if you had gastric bypass surgery  This surgery can affect the way your body absorbs medicines such as birth control pills  · Your provider will also check your blood pressure, and may do a breast and pelvic exam  A Pap smear may also be done during the pelvic exam  This is a test to make sure you do not have abnormal changes on your cervix  You may need other tests, such as a urine test to make sure you are not pregnant  · Your provider will ask if you take any medicines and if you smoke   Smoking increases your risk for stroke, heart attack, or a blood clot in your lungs  If you smoke, you should not take certain kinds of birth control pills  What are the advantages of birth control pills? When birth control pills are used correctly, the chances of getting pregnant are very low  Birth control pills may help decrease bleeding and pain during your monthly period  They may also help prevent cancer of the uterus and ovaries  What are the disadvantages of birth control pills? You may have sudden changes in your mood or feelings while you take birth control pills  You may have nausea and a decreased sex drive  You may have an increased appetite and rapid weight gain  You may also have bleeding in between periods, less frequent periods, vaginal dryness, and breast pain  Birth control pills will not protect you from sexually transmitted infections  Rarely, some birth control pills can increase your risk for a blood clot  This may become life-threatening  What should I do if I decide I want to get pregnant? If you are planning to have a baby, ask your healthcare provider when you may stop taking your birth control pills  It may take some time for you to start ovulating again  Ask your healthcare provider for more information about pregnancy after birth control pills  When should I start taking birth control pills after I have a baby? If you are not breastfeeding, you may start taking birth control pills 3 weeks after you give birth  You may be able to take certain types of birth control pills if you are breastfeeding  These pills can be started from 6 weeks to 6 months after you give birth  Ask your healthcare provider for more information about when to start taking birth control pills after you give birth  What do I need to know about birth control pills and menopause? · Talk with your healthcare provider if you want to take birth control pills around menopause  · Around age 39, you will enter into perimenopause   This means your hormone levels are dropping and you are ovulating less often  You can still become pregnant during this time  The risk for problems, such as miscarriage, are higher if you become pregnant after age 39  Birth control pills will prevent pregnancy, and may also help prevent or relieve some signs and symptoms of menopause  Examples are hot flashes and mood swings  · Your provider will do tests when you are around age 48  The tests may show that you are in menopause  If the tests do not show menopause for sure, you may be able to continue taking the pill up to age 54  The decision will depend on your health and if you have any medical conditions, such as a blood clot  Call your local emergency number (911 in the [address]) for any of the following:   · You have any of the following signs of a stroke:      ? Numbness or drooping on one side of your face     ? Weakness in an arm or leg    ? Confusion or difficulty speaking    ? Dizziness, a severe headache, or vision loss    · You feel lightheaded, short of breath, and have chest pain  · You cough up blood  When should I seek immediate care? · Your arm or leg feels warm, tender, and painful  It may look swollen and red  · You have severe pain, numbness, or swelling in your arms or legs  When should I call my doctor? · You have forgotten to take a birth control pill  · You have mood changes, such as depression, since starting birth control pills  · You have nausea or are vomiting  · You have severe abdominal pain  · You missed a period and have questions or concerns about being pregnant  · You still have bleeding 4 months after taking birth control pills correctly  · You have questions or concerns about your condition or care  CARE AGREEMENT:   You have the right to help plan your care  Learn about your health condition and how it may be treated  Discuss treatment options with your healthcare providers to decide what care you want to receive   You always have the right to refuse treatment  The above information is an  only  It is not intended as medical advice for individual conditions or treatments  Talk to your doctor, nurse or pharmacist before following any medical regimen to see if it is safe and effective for you  © Copyright [address] Information is for End User's use only and may not be sold, redistributed or otherwise used for commercial purposes  All illustrations and images included in CareNotes® are the copyrighted property of [employer]  or [address] [place] [address]  Levonorgestrel/Ethinyl Estradiol (By mouth)   Ethinyl Estradiol (ETH-i-nil es-tra-DYE-ol), Levonorgestrel (mcp-eho-vbl-NISHANT-trel)  Prevents pregnancy  Brand Name(s): Afirmelle, Altana, Amethia, Amethia Lo, Kaylin, [place], [place], [facility], [facility], Rosibel, Tammy, Jovita, Diana, José, Kyle Steel   There may be other brand names for this medicine  When This Medicine Should Not Be Used: This medicine is not right for everyone  Do not use this medicine if you had an allergic reaction to levonorgestrel or ethinyl estradiol, or if you are pregnant  Do not use this medicine if you have active liver disease or liver cancer, breast cancer, uterine cancer, a blood vessel disorder, heart disease, high blood pressure that is not controlled, or a history of blood clots, heart attack, or stroke  Do not use this medicine if you have unusual vaginal bleeding that has not been checked by a doctor or if you ever had jaundice (yellow skin or eyes) caused by pregnancy or birth control pills  How to Use This Medicine:   Tablet  · Your doctor will tell you how much medicine to use  Do not use more than directed  · Read and follow the patient instructions that come with this medicine  Talk to your doctor or pharmacist if you have any questions  · Carefully follow your doctor's instructions about when to start taking your medicine   You may begin taking the pills on the first day of your menstrual period, or on the Sunday after your period begins  · You should also use a second form of birth control (including condoms, diaphragms, or contraceptive foams and jellies) when you first start using this medicine  · Take this medicine at the same time every day  Birth control pills work best when there is no more than 24 hours between doses  · Missed dose:   ? This medicine has specific patient instructions on what to do if you miss a dose  Read and follow these instructions carefully, and call your doctor if you have any questions  ? If you miss one active pill, take it as soon as you can  Then take your next pill at the regular time  This means you may take two pills in one day  ? If you miss two active pills in week 1 or 2, take two pills as soon as you can and two more pills the next day  Continue taking one pill a day until you finish the pack  Use another kind of birth control for seven days after you miss a dose  ? If you miss two active pills in week 3 or three or more active pills in a row in weeks 1, 2, or 3:  § Day 1 start--Throw out the rest of your pills and start a new pack on the same day  § Sunday start--Continue taking one pill a day until Sunday, then throw out the rest of the pack and start a new pack that same day  § Use a second form of birth control (including condom, spermicide) for 7 days after you miss a dose, to prevent pregnancy  ? You could have light bleeding or spotting any time you do not take a pill on schedule  The more pills you miss, the more likely you are to have bleeding  ? If you miss two periods in a row, call your doctor for a pregnancy test before you take any more pills  · Store the medicine in a closed container at room temperature, away from heat, moisture, and direct light    Drugs and Foods to Avoid:   Ask your doctor or pharmacist before using any other medicine, including over-the-counter medicines, vitamins, and herbal products  · Do not use this medicine together with medicine to treat hepatitis C virus infection, including ombitasvir/paritaprevir/ritonavir, with or without dasabuvir  · Some medicines can affect how levonorgestrel/ethinyl estradiol works  Tell your doctor if you are using any of the following:  ? Acetaminophen, ascorbic acid (vitamin C), atorvastatin, bosentan, cyclosporine, phenylbutazone, rifampin, Kota's wort, theophylline  ? Medicine to treat an infection (including ampicillin, fluconazole, griseofulvin, tetracycline, troleandomycin)  ? Medicine to treat HIV/AIDS (including indinavir, modafinil, ritonavir)  ? Medicine to treat seizures (including carbamazepine, felbamate, lamotrigine, oxcarbazepine, phenobarbital, phenytoin, primidone, topiramate)  Warnings While Using This Medicine:   · It is not safe to take this medicine during pregnancy  It could harm an unborn baby  Tell your doctor right away if you become pregnant  · Tell your doctor if you are breastfeeding, or if you have recently been pregnant  Tell your doctor if you have high blood pressure, high cholesterol, diabetes, breast lumps, migraine headache, hereditary angioedema, or a history of depression, epilepsy, gallbladder disease, heart disease, kidney disease, or irregular monthly periods  Tell your doctor if you smoke, wear contact lenses, or if you are having surgery that requires inactivity for a long time  · This medicine may cause the following problems:  ? Increased risk of heart attack, stroke, or blood clots  ? Increased risk of cancer (including cancer of the breast, endometrium, ovaries, and cervix)  ? Liver problems (including liver tumor or cancer)  ? Eye or vision problems  ? Gallbladder disease  ? High cholesterol in the blood  ? High blood pressure  · This medicine will not protect you from getting HIV/AIDS or other sexually transmitted diseases    · You might have some light bleeding or spotting when you first start using this medicine  This is usually normal and should not last long  However, if you have heavy bleeding or the bleeding lasts more than seven days in a row, call your doctor's office  · If you miss two periods in a row, call your doctor for a pregnancy test before you take any more pills  · Tell any doctor or dentist who treats you that you are using this medicine  You may need to stop using this medicine several days before you have surgery or medical tests  · Tell any doctor or dentist who treats you that you are using this medicine  This medicine may affect certain medical test results  · Your doctor will do lab tests at regular visits to check on the effects of this medicine  Keep all appointments  · Keep all medicine out of the reach of children  Never share your medicine with anyone    Possible Side Effects While Using This Medicine:   Call your doctor right away if you notice any of these side effects:  · Allergic reaction: Itching or hives, swelling in your face or hands, swelling or tingling in your mouth or throat, chest tightness, trouble breathing  · Breast lumps, pain, swelling, tenderness, or discharge  · Change in how much or how often you urinate  · Chest pain or tightness, trouble breathing, coughing up blood  · Dark urine, pale stools, loss of appetite, yellow skin or eyes  · Heavy vaginal bleeding  · Irregular, late, or missed menstrual periods  · Numbness or weakness in your arm or leg, or on one side of your body  · Pain in your lower leg (calf)  · Sudden and severe stomach pain, nausea, vomiting, lightheadedness  · Sudden or severe headache, problems with vision, speech, or walking  · Rapid weight gain, swelling in your hands, ankles, or feet  · Vision loss, double or blurred vision  If you notice these less serious side effects, talk with your doctor:   · Acne, mild skin rash, or darkened skin on your face  · Changes in appetite  · Contact lens discomfort, changes in vision  · Mild nausea, vomiting, diarrhea, stomach cramps, bloated feeling  · Mood changes, depression, nervousness, or trouble sleeping  · Pain or burning with urination  · Vaginal spotting or light bleeding, itching, discharge  If you notice other side effects that you think are caused by this medicine, tell your doctor  Call your doctor for medical advice about side effects  You may report side effects to FDA at 2-356-NFO-1926  © Copyright [address] [place] [address] Drive Information is for End User's use only and may not be sold, redistributed or otherwise used for commercial purposes  The above information is an  only  It is not intended as medical advice for individual conditions or treatments  Talk to your doctor, nurse or pharmacist before following any medical regimen to see if it is safe and effective for you

## 2021-01-12 ENCOUNTER — PATIENT OUTREACH (OUTPATIENT)
Dept: OBGYN CLINIC | Facility: CLINIC | Age: 31
End: 2021-01-12

## 2021-04-21 ENCOUNTER — OFFICE VISIT (OUTPATIENT)
Dept: OBGYN CLINIC | Facility: CLINIC | Age: 31
End: 2021-04-21

## 2021-04-21 ENCOUNTER — TELEPHONE (OUTPATIENT)
Dept: FAMILY MEDICINE CLINIC | Facility: CLINIC | Age: 31
End: 2021-04-21

## 2021-04-21 VITALS
DIASTOLIC BLOOD PRESSURE: 83 MMHG | HEART RATE: 72 BPM | WEIGHT: 148.6 LBS | BODY MASS INDEX: 30.01 KG/M2 | SYSTOLIC BLOOD PRESSURE: 120 MMHG

## 2021-04-21 DIAGNOSIS — F32.A DEPRESSION, UNSPECIFIED DEPRESSION TYPE: Primary | ICD-10-CM

## 2021-04-21 DIAGNOSIS — F33.9 RECURRENT MAJOR DEPRESSIVE DISORDER, REMISSION STATUS UNSPECIFIED (HCC): Primary | ICD-10-CM

## 2021-04-21 PROCEDURE — 99213 OFFICE O/P EST LOW 20 MIN: CPT | Performed by: OBSTETRICS & GYNECOLOGY

## 2021-04-21 NOTE — TELEPHONE ENCOUNTER
Pt was brought down from GYN by Dr Ashley to make new patient appointment to establish with a PCP and also to make an appointment to see her      Per Dr Ashley's request please schedule pt on 5/11 at 11am

## 2021-04-22 NOTE — ASSESSMENT & PLAN NOTE
[id number]  MDD vs PTSD  Has been off Zoloft due to financial constraints, medication was refilled with goodrx coupon  Will establish care with PCP in [place] and be reassessed in 2 weeks  Not actively suicidal, declined hospital admission  Patient evaluated with behaviorist who agrees with plan

## 2021-04-22 NOTE — PROGRESS NOTES
Assessment/Plan:    Depression  [id number]  MDD vs PTSD  Has been off Zoloft due to financial constraints, medication was refilled with goodrx coupon  Will establish care with PCP in [place] and be reassessed in 2 weeks  Not actively suicidal, declined hospital admission  Patient evaluated with behaviorist who agrees with plan  Diagnoses and all orders for this visit:    Depression, unspecified depression type          Subjective:      Patient ID: Willy Escalante is a 27 y o  female  HPI    27 ear old female presents to clinic for annual Pap smear, upon entering room she had a tearful affect, upon further interview this closed she has been feeling more depressed than usual lately, has been losing weight and having difficulties sleeping  She denies any active suicidal ideation, states she was on Zoloft and past which offered some relief fortunately she was unable to afford it due to lack of insurance is interested in resuming therapy in addition to following up with a therapist in establishing care with PCP  Patient met with behaviorist Dr Ajay Zepeda and will schedule follow-up in coming weeks  Declined ED evaluation, will follow-up with PCP  The following portions of the patient's history were reviewed and updated as appropriate: allergies, current medications, past family history, past medical history, past social history, past surgical history and problem list     Review of Systems   Constitutional: Negative for activity change, appetite change, fatigue, fever and unexpected weight change  HENT: Negative for sneezing, sore throat and tinnitus  Eyes: Negative for pain  Respiratory: Negative for cough, choking and chest tightness  Cardiovascular: Negative for chest pain, palpitations and leg swelling  Gastrointestinal: Negative for abdominal distention, abdominal pain, constipation, diarrhea, nausea and vomiting  Endocrine: Negative for polydipsia, polyphagia and polyuria  Genitourinary: Negative for dysuria and flank pain  Musculoskeletal: Negative for back pain  Neurological: Negative for dizziness, seizures, syncope, speech difficulty, light-headedness and numbness  Psychiatric/Behavioral: Positive for decreased concentration, dysphoric mood and sleep disturbance  Negative for agitation  Objective:      /83   Pulse 72   Wt 67 4 kg (148 lb 9 6 oz)   BMI 30 01 kg/m²          Physical Exam  Constitutional:       General: She is not in acute distress  Appearance: She is well-developed  She is not diaphoretic  HENT:      Head: Normocephalic and atraumatic  Eyes:      Pupils: Pupils are equal, round, and reactive to light  Neck:      Musculoskeletal: Neck supple  Thyroid: No thyromegaly  Trachea: No tracheal deviation  Cardiovascular:      Rate and Rhythm: Normal rate and regular rhythm  Heart sounds: Normal heart sounds  No murmur  Pulmonary:      Effort: Pulmonary effort is normal  No respiratory distress  Breath sounds: Normal breath sounds  No wheezing  Abdominal:      General: Bowel sounds are normal  There is no distension  Palpations: Abdomen is soft  Tenderness: There is no abdominal tenderness  There is no guarding  Musculoskeletal: Normal range of motion  Skin:     General: Skin is warm  Findings: No rash  Neurological:      Mental Status: She is alert and oriented to person, place, and time  Psychiatric:         Attention and Perception: She does not perceive auditory or visual hallucinations  Mood and Affect: Mood is depressed  Affect is tearful  Speech: She is communicative  Speech is not rapid and pressured  Behavior: Behavior is cooperative  Thought Content: Thought content does not include homicidal or suicidal plan

## 2021-05-04 ENCOUNTER — TELEPHONE (OUTPATIENT)
Dept: OBGYN CLINIC | Facility: CLINIC | Age: 31
End: 2021-05-04

## 2021-05-11 ENCOUNTER — OFFICE VISIT (OUTPATIENT)
Dept: FAMILY MEDICINE CLINIC | Facility: CLINIC | Age: 31
End: 2021-05-11

## 2021-05-11 VITALS
TEMPERATURE: 97.3 F | HEIGHT: 59 IN | RESPIRATION RATE: 16 BRPM | SYSTOLIC BLOOD PRESSURE: 110 MMHG | HEART RATE: 71 BPM | DIASTOLIC BLOOD PRESSURE: 64 MMHG | OXYGEN SATURATION: 98 % | WEIGHT: 149 LBS | BODY MASS INDEX: 30.04 KG/M2

## 2021-05-11 DIAGNOSIS — Z00.00 WELL ADULT EXAM: Primary | ICD-10-CM

## 2021-05-11 DIAGNOSIS — Z11.4 SCREENING FOR HIV (HUMAN IMMUNODEFICIENCY VIRUS): ICD-10-CM

## 2021-05-11 DIAGNOSIS — Z12.4 CERVICAL CANCER SCREENING: ICD-10-CM

## 2021-05-11 DIAGNOSIS — F41.9 ANXIETY: ICD-10-CM

## 2021-05-11 DIAGNOSIS — F32.A DEPRESSION, UNSPECIFIED DEPRESSION TYPE: ICD-10-CM

## 2021-05-11 DIAGNOSIS — Z23 NEED FOR VACCINATION: ICD-10-CM

## 2021-05-11 DIAGNOSIS — E66.09 CLASS 1 OBESITY DUE TO EXCESS CALORIES WITHOUT SERIOUS COMORBIDITY WITH BODY MASS INDEX (BMI) OF 30.0 TO 30.9 IN ADULT: ICD-10-CM

## 2021-05-11 DIAGNOSIS — Z01.00 ENCOUNTER FOR VISION SCREENING: ICD-10-CM

## 2021-05-11 DIAGNOSIS — Z91.09 ENVIRONMENTAL ALLERGIES: ICD-10-CM

## 2021-05-11 PROCEDURE — 90732 PPSV23 VACC 2 YRS+ SUBQ/IM: CPT

## 2021-05-11 PROCEDURE — 99395 PREV VISIT EST AGE 18-39: CPT | Performed by: FAMILY MEDICINE

## 2021-05-11 PROCEDURE — 90471 IMMUNIZATION ADMIN: CPT

## 2021-05-11 RX ORDER — HYDROXYZINE HYDROCHLORIDE 25 MG/1
25 TABLET, FILM COATED ORAL 2 TIMES DAILY PRN
Qty: 30 TABLET | Refills: 2 | Status: SHIPPED | OUTPATIENT
Start: 2021-05-11 | End: 2021-12-09

## 2021-05-11 RX ORDER — LORATADINE 10 MG/1
10 TABLET ORAL DAILY
Qty: 30 TABLET | Refills: 2 | Status: SHIPPED | OUTPATIENT
Start: 2021-05-11 | End: 2021-12-09

## 2021-05-11 NOTE — PROGRESS NOTES
[address] [facility]    NAME: Jennifer Wild  AGE: 27 y o  SEX: female  : 1990     DATE: 2021     Assessment and Plan:     Problem List Items Addressed This Visit        Other    Depression     PHQ-9 : 23, Improved from 26 two weeks ago  Increased Panic attacks  Star trial of hydroxyzine for anxiety  Scheduled to see Behavioral Health (Dr Aurelio Saldaña) today         Relevant Medications    hydrOXYzine HCL (ATARAX) 25 mg tablet    Other Relevant Orders    Vitamin D 25 hydroxy    Well adult exam - Primary     Immunizations updated  To schedule Covid vaccine  Pneumovax given today  Smoking cessation advised           Anxiety     Trial of Hydroxyzine 25 mg po bid prn  Advised coping skills  Monitor sx and will consult with Behavioral Health         Relevant Medications    hydrOXYzine HCL (ATARAX) 25 mg tablet    Class 1 obesity due to excess calories without serious comorbidity with body mass index (BMI) of 30 0 to 30 9 in adult     Body mass index is 30 09 kg/m²    Weight reduction counseling provided  Will check lab work for comorbidities           Relevant Orders    TSH, 3rd generation with Free T4 reflex    HEMOGLOBIN A1C W/ EAG ESTIMATION    Lipid panel    Vitamin D 25 hydroxy    Environmental allergies     Start Loratidine 10 mg daily  Avoid allergens  Consider adding Flonase in future         Relevant Medications    loratadine (CLARITIN) 10 mg tablet      Other Visit Diagnoses     Cervical cancer screening        Relevant Orders    Ambulatory referral to Obstetrics / Gynecology    Encounter for vision screening        Relevant Orders    Visual acuity screening    Need for vaccination        Relevant Orders    PNEUMOCOCCAL POLYSACCHARIDE VACCINE 23-VALENT =>1YO SQ IM (Completed)    Screening for HIV (human immunodeficiency virus)        Relevant Orders    HIV 1/2 ANTIGEN/ANTIBODY (4TH GENERATION) W REFLEX [facility] Immunizations and preventive care screenings were discussed with patient today  Appropriate education was printed on patient's after visit summary  Counseling:  Alcohol/drug use: discussed moderation in alcohol intake, the recommendations for healthy alcohol use, and avoidance of illicit drug use  Dental Health: discussed importance of regular tooth brushing, flossing, and dental visits  Injury prevention: discussed safety/seat belts, safety helmets, smoke detectors, carbon dioxide detectors, and smoking near bedding or upholstery  Sexual health: discussed sexually transmitted diseases, partner selection, use of condoms, avoidance of unintended pregnancy, and contraceptive alternatives  · Exercise: the importance of regular exercise/physical activity was discussed  Recommend exercise 3-5 times per week for at least 30 minutes  Return in about 4 weeks (around 6/8/2021) for [facility]  Chief Complaint:     Chief Complaint   Patient presents with   [address] [place] [address]     Annual Exam      History of Present Illness:     Adult Annual Physical   Patient here for a comprehensive physical exam  She is establishing care today  The patient reports problems - Depression  Recently seen in [facility] and diagnosed with relapse in Depression and started on Zoloft 50 mg one month ago  Pt states the medication is helping with depressive sx, but her panic attacks are occurring with more frequency almost daily  Due to that, she no longer drives and uses [employer]  Pt has h/o of Depression since childhood  She has been on and off medication since age 13, and ahs undergone therapy in the past   Lives alone with her cat, currently in relationship with boyfriend and one son, 15years old, lives in another city attending boarding school      Diet and Physical Activity  · Diet/Nutrition: well balanced diet, limited junk food and consuming 3-5 servings of fruits/vegetables daily    · Exercise: walking and 1-2 times a week on average  Depression Screening  PHQ-9 Depression Screening    PHQ-9:   Frequency of the following problems over the past two weeks:      Little interest or pleasure in doing things: 3 - nearly every day  Feeling down, depressed, or hopeless: 3 - nearly every day  Trouble falling or staying asleep, or sleeping too much: 3 - nearly every day  Feeling tired or having little energy: 3 - nearly every day  Poor appetite or overeatin - more than half the days  Feeling bad about yourself - or that you are a failure or have let yourself or your family down: 3 - nearly every day  Trouble concentrating on things, such as reading the newspaper or watching television: 3 - nearly every day  Moving or speaking so slowly that other people could have noticed  Or the opposite - being so fidgety or restless that you have been moving around a lot more than usual: 3 - nearly every day  Thoughts that you would be better off dead, or of hurting yourself in some way: 0 - not at all  PHQ-2 Score: 6  PHQ-9 Score: 23       General Health  · Sleep: sleeps poorly, gets 4-6 hours of sleep on average and unrefreshing sleep  · Hearing: not tested  · Vision: most recent eye exam >1 year ago  · Dental: regular dental visits, brushes teeth twice daily and flosses teeth occasionally  /GYN Health  · Patient is: premenopausal  · Last menstrual period: 2021, Irregular cycle  · Contraceptive method: oral contraceptives  Review of Systems:     Review of Systems   Constitutional: Positive for fatigue  Negative for chills and fever  HENT: Positive for congestion  Negative for rhinorrhea and sore throat  Respiratory: Negative for cough and shortness of breath  Cardiovascular: Negative for chest pain  Gastrointestinal: Positive for diarrhea  Negative for abdominal pain, constipation and nausea  Genitourinary: Negative for dysuria and hematuria     Skin: Negative for rash    Neurological: Negative for dizziness and headaches  Psychiatric/Behavioral: Positive for sleep disturbance  Negative for suicidal ideas  The patient is nervous/anxious  Past Medical History:     Past Medical History:   Diagnosis Date    Depression       Past Surgical History:     Past Surgical History:   Procedure Laterality Date    BREAST IMPLANT       SECTION      1      Social History:        Social History     Socioeconomic History    Marital status: /Civil Union     Spouse name: None    Number of children: 1    Years of education: None    Highest education level: None   Occupational History    Occupation: Recruting Coordinator   Social Needs    Financial resource strain: Somewhat hard    Food insecurity     Worry: Sometimes true     Inability: Never true    Transportation needs     Medical: No     Non-medical: No   Tobacco Use    Smoking status: Current Every Day Smoker     Packs/day: 0 25     Types: Cigarettes    Smokeless tobacco: Current User   Substance and Sexual Activity    Alcohol use: Not Currently     Comment: occasional    Drug use: Yes     Types: Marijuana     Comment: few times per week    Sexual activity: Yes     Partners: Male     Birth control/protection: OCP   Lifestyle    Physical activity     Days per week: None     Minutes per session: None    Stress:  To some extent   Relationships    Social connections     Talks on phone: More than three times a week     Gets together: More than three times a week     Attends Scientologist service: None     Active member of club or organization: None     Attends meetings of clubs or organizations: None     Relationship status: None    Intimate partner violence     Fear of current or ex partner: None     Emotionally abused: None     Physically abused: None     Forced sexual activity: None   Other Topics Concern    None   Social History Narrative    None      Family History:     Family History   Problem Relation Age of Onset    Depression Mother     Breast cancer Paternal Grandmother       Current Medications:     Current Outpatient Medications   Medication Sig Dispense Refill    ibuprofen (MOTRIN) 600 mg tablet Take 1 tablet (600 mg total) by mouth every 6 (six) hours as needed (cramping) 50 tablet 1    levonorgestrel-ethinyl estradiol (AVIANE,ALESSE,LESSINA) 0 1-20 MG-MCG per tablet Take 1 tablet by mouth daily 30 tablet 3    sertraline (ZOLOFT) 50 mg tablet Take 1 tablet (50 mg total) by mouth daily 90 tablet 1    hydrOXYzine HCL (ATARAX) 25 mg tablet Take 1 tablet (25 mg total) by mouth 2 (two) times a day as needed for anxiety 30 tablet 2    loratadine (CLARITIN) 10 mg tablet Take 1 tablet (10 mg total) by mouth daily 30 tablet 2     No current facility-administered medications for this visit  Allergies:     No Known Allergies   Physical Exam:     /64 (BP Location: Left arm, Patient Position: Sitting, Cuff Size: Standard)   Pulse 71   Temp (!) 97 3 °F (36 3 °C) (Temporal)   Resp 16   Ht 4' 11" (1 499 m)   Wt 67 6 kg (149 lb)   LMP 04/21/2021 (Approximate) Comment: STATES THEY ARE IRREGULAR  SpO2 98%   BMI 30 09 kg/m²     Physical Exam  Constitutional:       Appearance: She is obese  HENT:      Head: Normocephalic and atraumatic  Right Ear: Tympanic membrane, ear canal and external ear normal       Left Ear: Tympanic membrane, ear canal and external ear normal       Nose: Congestion present  Mouth/Throat:      Mouth: Mucous membranes are moist    Eyes:      Extraocular Movements: Extraocular movements intact  Conjunctiva/sclera: Conjunctivae normal    Neck:      Musculoskeletal: Normal range of motion  No muscular tenderness  Cardiovascular:      Rate and Rhythm: Normal rate  Heart sounds: Normal heart sounds  Pulmonary:      Effort: Pulmonary effort is normal  No respiratory distress  Breath sounds: Normal breath sounds     Abdominal:      General: Bowel sounds are normal  There is no distension  Palpations: Abdomen is soft  Musculoskeletal: Normal range of motion  Skin:     General: Skin is warm  Capillary Refill: Capillary refill takes less than 2 seconds  Neurological:      Mental Status: She is alert and oriented to person, place, and time  Psychiatric:         Attention and Perception: Attention normal          Mood and Affect: Mood is anxious  Speech: Speech normal          Behavior: Behavior is cooperative            MD [place] [address]

## 2021-05-11 NOTE — ASSESSMENT & PLAN NOTE
PHQ-9 : 23, Improved from 26 two weeks ago  Increased Panic attacks  Star trial of hydroxyzine for anxiety  Scheduled to see Behavioral Health (Dr Simona Woodard) today

## 2021-05-11 NOTE — ASSESSMENT & PLAN NOTE
Trial of Hydroxyzine 25 mg po bid prn  Advised coping skills  Monitor sx and will consult with Behavioral Health

## 2021-05-11 NOTE — PATIENT INSTRUCTIONS

## 2021-05-11 NOTE — ASSESSMENT & PLAN NOTE
Body mass index is 30 09 kg/m²    Weight reduction counseling provided  Will check lab work for comorbidities

## 2021-05-12 ENCOUNTER — PATIENT OUTREACH (OUTPATIENT)
Dept: FAMILY MEDICINE CLINIC | Facility: CLINIC | Age: 31
End: 2021-05-12

## 2021-05-12 DIAGNOSIS — F32.89 OTHER DEPRESSION: ICD-10-CM

## 2021-05-12 DIAGNOSIS — F41.9 ANXIETY: Primary | ICD-10-CM

## 2021-05-12 NOTE — LETTER
[address] [place] [address], [address] [place] [address]  [address] [id number]  [phone]    Re:    6/29/2021       Dear Lisseth Leger,    We tried to reach you by phone on three attempts and was unfortunately unable to reach you  It is important that you contact the [facility]  as soon as possible at: [phone]      Sincerely,         Candis Rangel MSW, LSW

## 2021-05-12 NOTE — PROGRESS NOTES
Delonte Pacheco    This is the patient we have discussed  Please reach out to her  Patient is having issues with insurance and she also needs to see a psychiatrist and therapist as soon as possible      Thank you so much    Dr Ashley

## 2021-05-13 ENCOUNTER — TELEPHONE (OUTPATIENT)
Dept: FAMILY MEDICINE CLINIC | Facility: CLINIC | Age: 31
End: 2021-05-13

## 2021-05-13 NOTE — TELEPHONE ENCOUNTER
----- Message from Tony Lopes MD sent at 5/12/2021 10:59 AM EDT -----  Please call pt to make a follow up appt with me in 2 weeks, instead of the 4 weeks I initially wrote on her Wrap up yesterday  You can open up another slot after my last one if needed        Thanks,  Dr Alecia Montez

## 2021-05-19 NOTE — TELEPHONE ENCOUNTER
3rd attempt, no answer lvm, letter will be mailed to pt's address for pt to return phone call and schedule appt

## 2021-06-08 DIAGNOSIS — Z30.011 ENCOUNTER FOR INITIAL PRESCRIPTION OF CONTRACEPTIVE PILLS: ICD-10-CM

## 2021-06-08 RX ORDER — LEVONORGESTREL AND ETHINYL ESTRADIOL 0.1-0.02MG
KIT ORAL
Qty: 28 TABLET | Refills: 0 | Status: SHIPPED | OUTPATIENT
Start: 2021-06-08 | End: 2021-12-09

## 2021-06-15 ENCOUNTER — TELEPHONE (OUTPATIENT)
Dept: OTHER | Facility: OTHER | Age: 31
End: 2021-06-15

## 2021-06-15 ENCOUNTER — TELEPHONE (OUTPATIENT)
Dept: OBGYN CLINIC | Facility: CLINIC | Age: 31
End: 2021-06-15

## 2021-06-29 NOTE — PROGRESS NOTES
VINCE BAIG received referral for outpatient [place] [address] services and attempted to contact Pt on three occassions however there was no response  VINCE BAIG sent unable to reach letter and will close referral at this time  VINCE BAIG will remain available for further assistance as needed

## 2021-12-09 ENCOUNTER — APPOINTMENT (EMERGENCY)
Dept: RADIOLOGY | Facility: HOSPITAL | Age: 31
End: 2021-12-09

## 2021-12-09 ENCOUNTER — NURSE TRIAGE (OUTPATIENT)
Dept: OTHER | Facility: OTHER | Age: 31
End: 2021-12-09

## 2021-12-09 ENCOUNTER — HOSPITAL ENCOUNTER (EMERGENCY)
Facility: HOSPITAL | Age: 31
Discharge: HOME | End: 2021-12-09
Attending: EMERGENCY MEDICINE
Payer: COMMERCIAL

## 2021-12-09 VITALS
WEIGHT: 141.54 LBS | OXYGEN SATURATION: 100 % | BODY MASS INDEX: 28.59 KG/M2 | DIASTOLIC BLOOD PRESSURE: 65 MMHG | RESPIRATION RATE: 16 BRPM | HEART RATE: 68 BPM | SYSTOLIC BLOOD PRESSURE: 113 MMHG | TEMPERATURE: 97.7 F

## 2021-12-09 DIAGNOSIS — R00.2 PALPITATIONS: Primary | ICD-10-CM

## 2021-12-09 LAB
ALBUMIN SERPL BCP-MCNC: 3.9 G/DL (ref 3.5–5)
ALP SERPL-CCNC: 49 U/L (ref 46–116)
ALT SERPL W P-5'-P-CCNC: 23 U/L (ref 12–78)
ANION GAP SERPL CALCULATED.3IONS-SCNC: 9 MMOL/L (ref 4–13)
AST SERPL W P-5'-P-CCNC: 15 U/L (ref 5–45)
ATRIAL RATE: 136 BPM
BASOPHILS # BLD AUTO: 0.03 THOUSANDS/ΜL (ref 0–0.1)
BASOPHILS NFR BLD AUTO: 0 % (ref 0–1)
BILIRUB DIRECT SERPL-MCNC: 0.1 MG/DL (ref 0–0.2)
BILIRUB SERPL-MCNC: 0.48 MG/DL (ref 0.2–1)
BILIRUB UR QL STRIP: NEGATIVE
BUN SERPL-MCNC: 9 MG/DL (ref 5–25)
CALCIUM SERPL-MCNC: 8.9 MG/DL (ref 8.3–10.1)
CARDIAC TROPONIN I PNL SERPL HS: <2 NG/L
CHLORIDE SERPL-SCNC: 103 MMOL/L (ref 100–108)
CLARITY UR: CLEAR
CO2 SERPL-SCNC: 24 MMOL/L (ref 21–32)
COLOR UR: YELLOW
CREAT SERPL-MCNC: 0.66 MG/DL (ref 0.6–1.3)
EOSINOPHIL # BLD AUTO: 0.21 THOUSAND/ΜL (ref 0–0.61)
EOSINOPHIL NFR BLD AUTO: 2 % (ref 0–6)
ERYTHROCYTE [DISTWIDTH] IN BLOOD BY AUTOMATED COUNT: 14.4 % (ref 11.6–15.1)
EXT PREG TEST URINE: NEGATIVE
EXT. CONTROL ED NAV: NORMAL
FLUAV RNA RESP QL NAA+PROBE: NEGATIVE
FLUBV RNA RESP QL NAA+PROBE: NEGATIVE
GFR SERPL CREATININE-BSD FRML MDRD: 118 ML/MIN/1.73SQ M
GLUCOSE SERPL-MCNC: 99 MG/DL (ref 65–140)
GLUCOSE UR STRIP-MCNC: NEGATIVE MG/DL
HCT VFR BLD AUTO: 42.3 % (ref 34.8–46.1)
HGB BLD-MCNC: 14 G/DL (ref 11.5–15.4)
HGB UR QL STRIP.AUTO: NEGATIVE
IMM GRANULOCYTES # BLD AUTO: 0.11 THOUSAND/UL (ref 0–0.2)
IMM GRANULOCYTES NFR BLD AUTO: 1 % (ref 0–2)
KETONES UR STRIP-MCNC: ABNORMAL MG/DL
LEUKOCYTE ESTERASE UR QL STRIP: NEGATIVE
LYMPHOCYTES # BLD AUTO: 1.89 THOUSANDS/ΜL (ref 0.6–4.47)
LYMPHOCYTES NFR BLD AUTO: 20 % (ref 14–44)
MAGNESIUM SERPL-MCNC: 1.8 MG/DL (ref 1.6–2.6)
MCH RBC QN AUTO: 30.2 PG (ref 26.8–34.3)
MCHC RBC AUTO-ENTMCNC: 33.1 G/DL (ref 31.4–37.4)
MCV RBC AUTO: 91 FL (ref 82–98)
MONOCYTES # BLD AUTO: 0.62 THOUSAND/ΜL (ref 0.17–1.22)
MONOCYTES NFR BLD AUTO: 7 % (ref 4–12)
NEUTROPHILS # BLD AUTO: 6.66 THOUSANDS/ΜL (ref 1.85–7.62)
NEUTS SEG NFR BLD AUTO: 70 % (ref 43–75)
NITRITE UR QL STRIP: NEGATIVE
NRBC BLD AUTO-RTO: 0 /100 WBCS
PH UR STRIP.AUTO: 5.5 [PH] (ref 4.5–8)
PLATELET # BLD AUTO: 296 THOUSANDS/UL (ref 149–390)
PMV BLD AUTO: 9.6 FL (ref 8.9–12.7)
POTASSIUM SERPL-SCNC: 4 MMOL/L (ref 3.5–5.3)
PROT SERPL-MCNC: 7.1 G/DL (ref 6.4–8.2)
PROT UR STRIP-MCNC: NEGATIVE MG/DL
QRS AXIS: 64 DEGREES
QRSD INTERVAL: 90 MS
QT INTERVAL: 406 MS
QTC INTERVAL: 408 MS
RBC # BLD AUTO: 4.63 MILLION/UL (ref 3.81–5.12)
RSV RNA RESP QL NAA+PROBE: NEGATIVE
SARS-COV-2 RNA RESP QL NAA+PROBE: NEGATIVE
SODIUM SERPL-SCNC: 136 MMOL/L (ref 136–145)
SP GR UR STRIP.AUTO: >=1.03 (ref 1–1.03)
T WAVE AXIS: 43 DEGREES
UROBILINOGEN UR QL STRIP.AUTO: 0.2 E.U./DL
VENTRICULAR RATE: 61 BPM
WBC # BLD AUTO: 9.52 THOUSAND/UL (ref 4.31–10.16)

## 2021-12-09 PROCEDURE — 81025 URINE PREGNANCY TEST: CPT | Performed by: EMERGENCY MEDICINE

## 2021-12-09 PROCEDURE — 84484 ASSAY OF TROPONIN QUANT: CPT | Performed by: EMERGENCY MEDICINE

## 2021-12-09 PROCEDURE — 96361 HYDRATE IV INFUSION ADD-ON: CPT

## 2021-12-09 PROCEDURE — 80048 BASIC METABOLIC PNL TOTAL CA: CPT | Performed by: EMERGENCY MEDICINE

## 2021-12-09 PROCEDURE — 71045 X-RAY EXAM CHEST 1 VIEW: CPT

## 2021-12-09 PROCEDURE — 99285 EMERGENCY DEPT VISIT HI MDM: CPT

## 2021-12-09 PROCEDURE — 93010 ELECTROCARDIOGRAM REPORT: CPT | Performed by: INTERNAL MEDICINE

## 2021-12-09 PROCEDURE — 99284 EMERGENCY DEPT VISIT MOD MDM: CPT | Performed by: EMERGENCY MEDICINE

## 2021-12-09 PROCEDURE — 83735 ASSAY OF MAGNESIUM: CPT | Performed by: EMERGENCY MEDICINE

## 2021-12-09 PROCEDURE — 85025 COMPLETE CBC W/AUTO DIFF WBC: CPT | Performed by: EMERGENCY MEDICINE

## 2021-12-09 PROCEDURE — 0241U HB NFCT DS VIR RESP RNA 4 TRGT: CPT | Performed by: EMERGENCY MEDICINE

## 2021-12-09 PROCEDURE — 93005 ELECTROCARDIOGRAM TRACING: CPT

## 2021-12-09 PROCEDURE — 80076 HEPATIC FUNCTION PANEL: CPT | Performed by: EMERGENCY MEDICINE

## 2021-12-09 PROCEDURE — 81003 URINALYSIS AUTO W/O SCOPE: CPT

## 2021-12-09 PROCEDURE — 36415 COLL VENOUS BLD VENIPUNCTURE: CPT | Performed by: EMERGENCY MEDICINE

## 2021-12-09 PROCEDURE — 96374 THER/PROPH/DIAG INJ IV PUSH: CPT

## 2021-12-09 RX ORDER — KETOROLAC TROMETHAMINE 30 MG/ML
15 INJECTION, SOLUTION INTRAMUSCULAR; INTRAVENOUS ONCE
Status: COMPLETED | OUTPATIENT
Start: 2021-12-09 | End: 2021-12-09

## 2021-12-09 RX ADMIN — SODIUM CHLORIDE 1000 ML: 0.9 INJECTION, SOLUTION INTRAVENOUS at 08:10

## 2021-12-09 RX ADMIN — KETOROLAC TROMETHAMINE 15 MG: 30 INJECTION, SOLUTION INTRAMUSCULAR; INTRAVENOUS at 08:11

## 2022-04-23 ENCOUNTER — HOSPITAL ENCOUNTER (EMERGENCY)
Facility: HOSPITAL | Age: 32
Discharge: HOME | End: 2022-04-23
Attending: EMERGENCY MEDICINE | Admitting: EMERGENCY MEDICINE

## 2022-04-23 VITALS
TEMPERATURE: 98.4 F | HEART RATE: 87 BPM | DIASTOLIC BLOOD PRESSURE: 95 MMHG | RESPIRATION RATE: 15 BRPM | OXYGEN SATURATION: 99 % | SYSTOLIC BLOOD PRESSURE: 132 MMHG

## 2022-04-23 DIAGNOSIS — L03.011 PARONYCHIA OF RIGHT MIDDLE FINGER: Primary | ICD-10-CM

## 2022-04-23 PROCEDURE — 10060 I&D ABSCESS SIMPLE/SINGLE: CPT | Performed by: EMERGENCY MEDICINE

## 2022-04-23 PROCEDURE — 99283 EMERGENCY DEPT VISIT LOW MDM: CPT

## 2022-04-23 PROCEDURE — 99284 EMERGENCY DEPT VISIT MOD MDM: CPT | Performed by: EMERGENCY MEDICINE

## 2022-04-23 RX ORDER — LIDOCAINE HYDROCHLORIDE 10 MG/ML
5 INJECTION, SOLUTION EPIDURAL; INFILTRATION; INTRACAUDAL; PERINEURAL ONCE
Status: COMPLETED | OUTPATIENT
Start: 2022-04-23 | End: 2022-04-23

## 2022-04-23 RX ORDER — MULTIVIT-MIN/IRON FUM/FOLIC AC 7.5 MG-4
1 TABLET ORAL DAILY
COMMUNITY

## 2022-04-23 RX ORDER — CEPHALEXIN 500 MG/1
500 CAPSULE ORAL 2 TIMES DAILY
Qty: 10 CAPSULE | Refills: 0 | Status: SHIPPED | OUTPATIENT
Start: 2022-04-23 | End: 2022-04-28

## 2022-04-23 RX ADMIN — LIDOCAINE HYDROCHLORIDE 5 ML: 10 INJECTION, SOLUTION EPIDURAL; INFILTRATION; INTRACAUDAL; PERINEURAL at 08:10

## 2022-04-23 NOTE — ED PROVIDER NOTES
History  Chief Complaint   Patient presents with    Finger Swelling     right 3rd digit swelling and pain x 1 week after biting nails  31 yo F c/o pain and swelling of Right middle finger around the fingernail, increasing for the past 3 days, she suspects because she bites her fingernails  History provided by:  Patient      Prior to Admission Medications   Prescriptions Last Dose Informant Patient Reported? Taking? Multiple Vitamins-Minerals (multivitamin with minerals) tablet   Yes Yes   Sig: Take 1 tablet by mouth daily      Facility-Administered Medications: None       Past Medical History:   Diagnosis Date    Depression        Past Surgical History:   Procedure Laterality Date    BREAST IMPLANT       SECTION      1       Family History   Problem Relation Age of Onset    Depression Mother     Breast cancer Paternal Grandmother      I have reviewed and agree with the history as documented  E-Cigarette/Vaping     E-Cigarette/Vaping Substances     Social History     Tobacco Use    Smoking status: Current Every Day Smoker     Packs/day: 0 25     Types: Cigarettes    Smokeless tobacco: Current User   Substance Use Topics    Alcohol use: Not Currently     Comment: occasional    Drug use: Yes     Types: Marijuana     Comment: few times per week       Review of Systems   Constitutional: Negative for appetite change, chills and fever  HENT: Negative for sore throat  Respiratory: Negative for cough, shortness of breath and wheezing  Cardiovascular: Negative for chest pain and palpitations  Gastrointestinal: Negative for abdominal pain, diarrhea, nausea and vomiting  Genitourinary: Negative for dysuria and hematuria  Musculoskeletal: Negative for neck pain  Skin: Negative for rash  Neurological: Negative for dizziness, weakness and headaches  Psychiatric/Behavioral: Negative for suicidal ideas  All other systems reviewed and are negative        Physical Exam  Physical Exam  Vitals and nursing note reviewed  Constitutional:       General: She is not in acute distress  Appearance: She is well-developed  She is not diaphoretic  HENT:      Head: Normocephalic and atraumatic  Right Ear: External ear normal       Left Ear: External ear normal       Nose: Nose normal    Eyes:      Conjunctiva/sclera: Conjunctivae normal       Pupils: Pupils are equal, round, and reactive to light  Cardiovascular:      Rate and Rhythm: Normal rate and regular rhythm  Pulmonary:      Effort: Pulmonary effort is normal    Abdominal:      Palpations: Abdomen is soft  Musculoskeletal:         General: Normal range of motion  Hands:       Cervical back: Normal range of motion and neck supple  Skin:     General: Skin is warm and dry  Findings: No rash  Neurological:      Mental Status: She is alert and oriented to person, place, and time  Gait: Gait normal    Psychiatric:         Behavior: Behavior normal          Thought Content: Thought content normal          Judgment: Judgment normal          Vital Signs  ED Triage Vitals [04/23/22 0800]   Temperature Pulse Respirations Blood Pressure SpO2   98 4 °F (36 9 °C) 87 15 132/95 99 %      Temp Source Heart Rate Source Patient Position - Orthostatic VS BP Location FiO2 (%)   Oral -- -- -- --      Pain Score       9           Vitals:    04/23/22 0800   BP: 132/95   Pulse: 87         Visual Acuity      ED Medications  Medications   lidocaine (PF) (XYLOCAINE-MPF) 1 % injection 5 mL (5 mL Infiltration Given by Other 4/23/22 0810)       Diagnostic Studies  Results Reviewed     None                 No orders to display              Procedures  Incision and drain    Date/Time: 4/23/2022 8:39 AM  Performed by: Susan Villavicencio MD  Authorized by: Susan Villavicencio MD   Universal Protocol:  Consent: Verbal consent obtained    Consent given by: patient      Location:     Type:  Abscess    Location:  Upper extremity    Upper extremity location:  R long finger  Pre-procedure details:     Skin preparation:  Betadine  Anesthesia (see MAR for exact dosages): Anesthesia method:  Nerve block    Block location:  Digital block    Block needle gauge:  27 G    Block anesthetic:  Lidocaine 1% w/o epi    Block injection procedure:  Anatomic landmarks identified and negative aspiration for blood    Block outcome:  Anesthesia achieved  Procedure details:     Complexity:  Simple    Needle aspiration: no      Incision types:  Stab incision    Scalpel blade:  11    Approach:  Open    Incision depth:  Superficial    Wound management:  Probed and deloculated and irrigated with saline    Drainage:  Purulent    Drainage amount: Moderate    Wound treatment:  Wound left open    Packing materials:  None  Post-procedure details:     Patient tolerance of procedure: Tolerated well, no immediate complications             ED Course                               SBIRT 22yo+      Most Recent Value   SBIRT (22 yo +)    In order to provide better care to our patients, we are screening all of our patients for alcohol and drug use  Would it be okay to ask you these screening questions? No Filed at: 04/23/2022 0803                    MDM    Disposition  Final diagnoses:   Paronychia of right middle finger     Time reflects when diagnosis was documented in both MDM as applicable and the Disposition within this note     Time User Action Codes Description Comment    4/23/2022  8:33 AM Felicitas DOBBS Add [D38 191] Paronychia of right middle finger       ED Disposition     ED Disposition Condition Date/Time Comment    Discharge Good Sat Apr 23, 2022  8:33 AM Mj Later discharge to home/self care              Follow-up Information     Follow up With Specialties Details Why Contact Info    Fatemeh Almodovar MD Family Medicine Call  If symptoms worsen [address]  [phone]            Discharge Medication List as of 4/23/2022  8:34 AM START taking these medications    Details   cephalexin (KEFLEX) 500 mg capsule Take 1 capsule (500 mg total) by mouth 2 (two) times a day for 5 days, Starting Sat 4/23/2022, Until Thu 4/28/2022, Normal         CONTINUE these medications which have NOT CHANGED    Details   Multiple Vitamins-Minerals (multivitamin with minerals) tablet Take 1 tablet by mouth daily, Historical Med             No discharge procedures on file      PDMP Review     None          ED Provider  Electronically Signed by           Joseph Albrecht MD  04/23/22 9270

## 2022-04-23 NOTE — DISCHARGE INSTRUCTIONS
Paronychia   WHAT YOU NEED TO KNOW:   Paronychia is an infection of your nail fold caused by bacteria or a fungus  The nail fold is the skin around your nail  Paronychia may happen suddenly and last for 6 weeks or longer  You may have paronychia on more than 1 finger or toe  Follow up with podiatrist as directed:     Self-care:   Soak your nail:  Soak your nail in a mixture of equal parts vinegar and water 3 or 4 times each day  This will help decrease inflammation  Apply a warm compress:  Soak a washcloth in warm water and place it on your nail  This will help decrease inflammation  Elevate:  Raise your nail above the level of your heart as often as you can  This will help decrease swelling and pain  Prop your nail on pillows or blankets to keep it elevated comfortably  Prevent paronychia:   Avoid chemicals and allergens that may harm your skin and nails  This includes soaps, laundry detergents, and nail products  Keep your nails clean and dry  Avoid soaking your nails in water  Use cotton-lined rubber gloves or wear 2 rubber gloves if you work with food or water  The gloves will help protect your nail folds  Keep your nails short  Do not bite your nails, pick at your hangnails, suck your fingers, or wear fake nails  Bring your own nail tools when you go to the nail salon  Contact your healthcare provider if:   Your nail becomes loose, deformed, or falls off  You have a large abscess on your nail  Return to the emergency department if:   You have severe nail pain

## 2022-09-02 ENCOUNTER — OFFICE VISIT (OUTPATIENT)
Dept: OBGYN CLINIC | Facility: CLINIC | Age: 32
End: 2022-09-02

## 2022-09-02 VITALS
WEIGHT: 140.8 LBS | SYSTOLIC BLOOD PRESSURE: 134 MMHG | BODY MASS INDEX: 28.39 KG/M2 | HEIGHT: 59 IN | HEART RATE: 96 BPM | DIASTOLIC BLOOD PRESSURE: 94 MMHG

## 2022-09-02 DIAGNOSIS — Z30.011 ENCOUNTER FOR INITIAL PRESCRIPTION OF CONTRACEPTIVE PILLS: Primary | ICD-10-CM

## 2022-09-02 PROCEDURE — 99213 OFFICE O/P EST LOW 20 MIN: CPT | Performed by: OBSTETRICS & GYNECOLOGY

## 2022-09-02 RX ORDER — NORGESTIMATE AND ETHINYL ESTRADIOL 7DAYSX3 28
1 KIT ORAL DAILY
Qty: 30 TABLET | Refills: 11 | Status: SHIPPED | OUTPATIENT
Start: 2022-09-02

## 2022-09-04 NOTE — PROGRESS NOTES
PROBLEM GYNECOLOGICAL VISIT    Robson Duran is a 32 y o  female who presents today with complaint of had an  requesting contraceptive  Her general medical history has been reviewed and she reports it as follows:    Past Medical History:   Diagnosis Date    Depression      Past Surgical History:   Procedure Laterality Date    BREAST IMPLANT       SECTION      1     OB History        4    Para   1    Term   1            AB   1    Living   1       SAB        IAB   1    Ectopic        Multiple        Live Births   1               Social History     Tobacco Use    Smoking status: Current Every Day Smoker     Packs/day: 0 25     Types: Cigarettes    Smokeless tobacco: Current User   Substance Use Topics    Alcohol use: Not Currently     Comment: occasional    Drug use: Yes     Types: Marijuana     Comment: few times per week     Social History     Substance and Sexual Activity   Sexual Activity Not Currently    Partners: Male       Current Outpatient Medications   Medication Instructions    Multiple Vitamins-Minerals (multivitamin with minerals) tablet 1 tablet, Oral, Daily    norgestimate-ethinyl estradiol (Tri Femynor) 0 18/0 215/0 25 MG-35 MCG per tablet 1 tablet, Oral, Daily       History of Present Illness:   Patient presents with c/o having an  recently requesting something for contraceptive unsure of what is her options  Patient states has been having issues with acne and desires no contraceptive that would make it worst     Review of Systems:  Review of Systems   All other systems reviewed and are negative  Physical Exam:  /94   Pulse 96   Ht 4' 11" (1 499 m)   Wt 63 9 kg (140 lb 12 8 oz)   LMP 2022 (Exact Date) Comment: recent   BMI 28 44 kg/m²   Physical Exam  Constitutional:       Appearance: Normal appearance  Neurological:      Mental Status: She is alert and oriented to person, place, and time     Psychiatric: Behavior: Behavior normal          Discussion: The most effective contraceptive that will help her with the acne is a combination OCP  Assessment:   1  Contraceptive management    Plan:              1  OCP  prescribed   2  Return to office 3mos for ocp check  Review[facility] with patient [facility], but that they will not necessarily be reviewed by me immediately  Explained that I will review results at my earliest opportunity and contact patient appropriately

## 2022-09-04 NOTE — PATIENT INSTRUCTIONS
Thank you for your confidence in our team    We appreciate you and welcome your feedback  If you receive a survey from us, please take a few moments to let us know how we are doing     Sincerely,  [facility], DO

## 2022-10-12 ENCOUNTER — ANNUAL EXAM (OUTPATIENT)
Dept: OBGYN CLINIC | Facility: CLINIC | Age: 32
End: 2022-10-12

## 2022-10-12 ENCOUNTER — PATIENT OUTREACH (OUTPATIENT)
Dept: OBGYN CLINIC | Facility: CLINIC | Age: 32
End: 2022-10-12

## 2022-10-12 VITALS
SYSTOLIC BLOOD PRESSURE: 123 MMHG | DIASTOLIC BLOOD PRESSURE: 88 MMHG | WEIGHT: 140.2 LBS | BODY MASS INDEX: 28.32 KG/M2 | HEART RATE: 81 BPM

## 2022-10-12 DIAGNOSIS — Z12.4 SCREENING FOR CERVICAL CANCER: ICD-10-CM

## 2022-10-12 DIAGNOSIS — Z13.31 POSITIVE DEPRESSION SCREENING: ICD-10-CM

## 2022-10-12 DIAGNOSIS — Z20.2 POSSIBLE EXPOSURE TO STD: ICD-10-CM

## 2022-10-12 DIAGNOSIS — Z01.419 ROUTINE GYNECOLOGICAL EXAMINATION: Primary | ICD-10-CM

## 2022-10-12 DIAGNOSIS — Z11.3 SCREEN FOR STD (SEXUALLY TRANSMITTED DISEASE): ICD-10-CM

## 2022-10-12 PROBLEM — Z00.00 WELL ADULT EXAM: Status: RESOLVED | Noted: 2021-05-11 | Resolved: 2022-10-12

## 2022-10-12 PROCEDURE — 87491 CHLMYD TRACH DNA AMP PROBE: CPT | Performed by: OBSTETRICS & GYNECOLOGY

## 2022-10-12 PROCEDURE — 99395 PREV VISIT EST AGE 18-39: CPT | Performed by: OBSTETRICS & GYNECOLOGY

## 2022-10-12 PROCEDURE — G0145 SCR C/V CYTO,THINLAYER,RESCR: HCPCS | Performed by: OBSTETRICS & GYNECOLOGY

## 2022-10-12 PROCEDURE — G0476 HPV COMBO ASSAY CA SCREEN: HCPCS | Performed by: OBSTETRICS & GYNECOLOGY

## 2022-10-12 PROCEDURE — 87591 N.GONORRHOEAE DNA AMP PROB: CPT | Performed by: OBSTETRICS & GYNECOLOGY

## 2022-10-12 NOTE — PATIENT INSTRUCTIONS
Thank you for your confidence in our team    We appreciate you and welcome your feedback  If you receive a survey from us, please take a few moments to let us know how we are doing     Sincerely,  Vani Newell

## 2022-10-12 NOTE — PROGRESS NOTES
Jeremias Forbes is a 32 y o  female who presents today for annual GYN exam   Her last pap smear was performed unsure and result was negative  She reports no history of abnormal pap smears in her past  She reports menses as regular  Patient's last menstrual period was 2022  Her general medical history has been reviewed and she reports it as follows:    Past Medical History:   Diagnosis Date   • Depression      Past Surgical History:   Procedure Laterality Date   • BREAST IMPLANT     •  SECTION      1     OB History        4    Para   1    Term   1            AB   1    Living   1       SAB        IAB   1    Ectopic        Multiple        Live Births   1               Social History     Tobacco Use   • Smoking status: Current Every Day Smoker     Packs/day: 0 25     Types: Cigarettes   • Smokeless tobacco: Current User   Substance Use Topics   • Alcohol use: Not Currently     Comment: occasional   • Drug use: Yes     Types: Marijuana     Comment: few times per week     Social History     Substance and Sexual Activity   Sexual Activity Not Currently   • Partners: Male     Cancer-related family history includes Breast cancer in her paternal grandmother  Current Outpatient Medications   Medication Instructions   • Multiple Vitamins-Minerals (multivitamin with minerals) tablet 1 tablet, Oral, Daily   • norgestimate-ethinyl estradiol (Tri Femynor) 0 18/0 215/0 25 MG-35 MCG per tablet 1 tablet, Oral, Daily       Review of Systems:  Review of Systems   Psychiatric/Behavioral:        Depress and anxious   All other systems reviewed and are negative  Physical Exam:  /88   Pulse 81   Wt 63 6 kg (140 lb 3 2 oz)   LMP 2022   BMI 28 32 kg/m²   Physical Exam  Constitutional:       Appearance: Normal appearance  Genitourinary:      Bladder and urethral meatus normal       No lesions in the vagina        Right Labia: No rash, tenderness or lesions  Left Labia: No tenderness, lesions or rash  No vaginal discharge or bleeding  Right Adnexa: not tender, not full and no mass present  Left Adnexa: not tender, not full and no mass present  No cervical motion tenderness, discharge or lesion  Uterus is not enlarged or tender  No uterine mass detected  No urethral tenderness or mass present  Breasts:      Right: Breast implant present  No swelling, bleeding, inverted nipple, mass, nipple discharge, skin change, tenderness, axillary adenopathy or supraclavicular adenopathy  Left: Breast implant present  No swelling, bleeding, inverted nipple, mass, nipple discharge, skin change, tenderness, axillary adenopathy or supraclavicular adenopathy  HENT:      Head: Normocephalic  Cardiovascular:      Rate and Rhythm: Normal rate and regular rhythm  Pulmonary:      Effort: Pulmonary effort is normal       Breath sounds: Normal breath sounds  Abdominal:      General: Abdomen is flat  Bowel sounds are normal       Palpations: Abdomen is soft  Comments: Positive abdominoplasty noted   Musculoskeletal:         General: Normal range of motion  Cervical back: Normal range of motion  Lymphadenopathy:      Upper Body:      Right upper body: No supraclavicular or axillary adenopathy  Left upper body: No supraclavicular or axillary adenopathy  Neurological:      Mental Status: She is alert and oriented to person, place, and time  Psychiatric:         Mood and Affect: Mood normal    Vitals reviewed  Assessment/Plan:   1  Normal well-woman GYN exam   2  Cervical cancer screening:  Normal cervical exam   Pap smear done with HPV co-testing  3  STD screening:   Orders placed for vaginal GC/CT cultures  Orders placed for serum anti-HIV, anti-HCV, HbsAg, RPR, HSV  4  Breast cancer screening:  Normal breast exam   Reviewed breast self-awareness     5  Depression Screening: Patient's depression screening was assessed with a PHQ-2 score of 6  Their PHQ-9 score was 25  Referral placed for  consultation  6  BMI Counseling: Body mass index is 28 32 kg/m²  7  Return to office 1yr/prn  Reviewed with patient that test results are available in MyChart immediately, but that they will not necessarily be reviewed by me immediately  Explained that I will review results at my earliest opportunity and contact patient appropriately

## 2022-10-12 NOTE — LETTER
10/14/2022    To [place] [facility]  : 1990      This letter is to advise you that your recent CULTURES for gonorrhea and chlamydia were reviewed by me and are NORMAL  Please contact the office for an appointment if you have any additional concerns      Azalia Hong Toussaint-Foster
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
## 2022-10-14 LAB
C TRACH DNA SPEC QL NAA+PROBE: NEGATIVE
HPV HR 12 DNA CVX QL NAA+PROBE: NEGATIVE
HPV16 DNA CVX QL NAA+PROBE: NEGATIVE
HPV18 DNA CVX QL NAA+PROBE: NEGATIVE
N GONORRHOEA DNA SPEC QL NAA+PROBE: NEGATIVE

## 2022-10-19 LAB
LAB AP GYN PRIMARY INTERPRETATION: NORMAL
Lab: NORMAL

## 2022-11-11 ENCOUNTER — PATIENT OUTREACH (OUTPATIENT)
Dept: OBGYN CLINIC | Facility: CLINIC | Age: 32
End: 2022-11-11

## 2022-11-11 NOTE — PROGRESS NOTES
Left reminder for pt that she has her psychiatric intake with Dr Ermelinda Patricia on Monday at [address] her that if she can not attend for any reason to please call to re-schedule

## 2022-12-05 ENCOUNTER — TELEPHONE (OUTPATIENT)
Dept: OBGYN CLINIC | Facility: CLINIC | Age: 32
End: 2022-12-05

## 2022-12-15 ENCOUNTER — PATIENT OUTREACH (OUTPATIENT)
Dept: OBGYN CLINIC | Facility: CLINIC | Age: 32
End: 2022-12-15

## 2022-12-15 NOTE — PROGRESS NOTES
Pt no showed to her two psychiatric intake appts in our office, SW SAFIA mailed out additional community resources for her ([facility] [address] funding as pt has no insurance) if she is interested in establishing psychiatric care, will not re-schedule the pt to see psychiatry in our office at this time

## 2023-01-04 ENCOUNTER — TELEPHONE (OUTPATIENT)
Dept: OBGYN CLINIC | Facility: CLINIC | Age: 33
End: 2023-01-04

## 2023-10-18 ENCOUNTER — ANNUAL EXAM (OUTPATIENT)
Dept: OBGYN CLINIC | Facility: CLINIC | Age: 33
End: 2023-10-18

## 2023-10-18 ENCOUNTER — PATIENT OUTREACH (OUTPATIENT)
Dept: OBGYN CLINIC | Facility: CLINIC | Age: 33
End: 2023-10-18

## 2023-10-18 VITALS
SYSTOLIC BLOOD PRESSURE: 119 MMHG | DIASTOLIC BLOOD PRESSURE: 73 MMHG | WEIGHT: 147 LBS | BODY MASS INDEX: 29.64 KG/M2 | HEIGHT: 59 IN | HEART RATE: 91 BPM

## 2023-10-18 DIAGNOSIS — Z11.3 SCREEN FOR STD (SEXUALLY TRANSMITTED DISEASE): ICD-10-CM

## 2023-10-18 DIAGNOSIS — Z12.4 SCREENING FOR CERVICAL CANCER: ICD-10-CM

## 2023-10-18 DIAGNOSIS — N92.6 MISSED MENSES: ICD-10-CM

## 2023-10-18 DIAGNOSIS — Z12.39 ENCOUNTER FOR BREAST CANCER SCREENING USING NON-MAMMOGRAM MODALITY: ICD-10-CM

## 2023-10-18 DIAGNOSIS — Z13.31 POSITIVE DEPRESSION SCREENING: ICD-10-CM

## 2023-10-18 DIAGNOSIS — Z01.419 ENCOUNTER FOR GYNECOLOGICAL EXAMINATION WITHOUT ABNORMAL FINDING: Primary | ICD-10-CM

## 2023-10-18 DIAGNOSIS — Z32.01 POSITIVE PREGNANCY TEST: ICD-10-CM

## 2023-10-18 PROBLEM — Z30.011 ENCOUNTER FOR INITIAL PRESCRIPTION OF CONTRACEPTIVE PILLS: Status: RESOLVED | Noted: 2019-03-26 | Resolved: 2023-10-18

## 2023-10-18 PROBLEM — E66.09 CLASS 1 OBESITY DUE TO EXCESS CALORIES WITHOUT SERIOUS COMORBIDITY WITH BODY MASS INDEX (BMI) OF 30.0 TO 30.9 IN ADULT: Status: RESOLVED | Noted: 2021-05-11 | Resolved: 2023-10-18

## 2023-10-18 PROBLEM — F41.9 ANXIETY: Status: RESOLVED | Noted: 2021-05-11 | Resolved: 2023-10-18

## 2023-10-18 PROBLEM — Z91.09 ENVIRONMENTAL ALLERGIES: Status: RESOLVED | Noted: 2021-05-11 | Resolved: 2023-10-18

## 2023-10-18 LAB — SL AMB POCT URINE HCG: POSITIVE

## 2023-10-18 PROCEDURE — 87591 N.GONORRHOEAE DNA AMP PROB: CPT | Performed by: NURSE PRACTITIONER

## 2023-10-18 PROCEDURE — 87491 CHLMYD TRACH DNA AMP PROBE: CPT | Performed by: NURSE PRACTITIONER

## 2023-10-18 RX ORDER — NORGESTIMATE AND ETHINYL ESTRADIOL 7DAYSX3 28
1 KIT ORAL DAILY
Qty: 28 TABLET | Refills: 12 | Status: SHIPPED | OUTPATIENT
Start: 2023-10-18 | End: 2023-10-18

## 2023-10-18 NOTE — PROGRESS NOTES
Chandni Jesus is a 28 y.o. female who presents today for annual GYN exam.  Her last pap smear was performed 10/12/2022 and result was NILM with negative HPV. She reports no history of abnormal pap smears in her past.  She reports menses as regular. Patient's last menstrual period was 2023 (exact date). Her general medical history has been reviewed and she reports it as follows:    Past Medical History:   Diagnosis Date    Depression      Past Surgical History:   Procedure Laterality Date    ABDOMINOPLASTY      BREAST IMPLANT Bilateral 2016     SECTION       OB History          4    Para   1    Term   1       0    AB   3    Living   1         SAB   1    IAB   2    Ectopic   0    Multiple   0    Live Births   1               Social History     Tobacco Use    Smoking status: Every Day     Packs/day: 0.25     Types: Cigarettes    Smokeless tobacco: Never   Substance Use Topics    Alcohol use: Yes     Comment: couple times/year    Drug use: Yes     Types: Marijuana     Comment: couple times/week     Social History     Substance and Sexual Activity   Sexual Activity Yes    Partners: Male    Birth control/protection: OCP     Cancer-related family history includes Breast cancer in her paternal grandmother. There is no history of Colon cancer or Ovarian cancer. Current Outpatient Medications   Medication Instructions    Multiple Vitamins-Minerals (multivitamin with minerals) tablet 1 tablet, Oral, Daily    norgestimate-ethinyl estradiol (Tri Femynor) 0.18/0.215/0.25 MG-35 MCG per tablet 1 tablet, Oral, Daily       Review of Systems:  Review of Systems   Constitutional: Negative. Gastrointestinal: Negative. Genitourinary:  Negative for difficulty urinating, menstrual problem, pelvic pain and vaginal discharge. Skin: Negative.         Physical Exam:  /73   Pulse 91   Ht 4' 11" (1.499 m)   Wt 66.7 kg (147 lb)   LMP 2023 (Exact Date)   BMI 29.69 kg/m²   Physical Exam  Constitutional:       General: She is not in acute distress. Appearance: She is well-developed. Genitourinary:      Vulva normal.      No lesions in the vagina. Right Adnexa: not tender and no mass present. Left Adnexa: not tender and no mass present. No cervical motion tenderness or lesion. Uterus is not tender. Breasts:     Right: No mass, nipple discharge, skin change or tenderness. Left: No mass, nipple discharge, skin change or tenderness. Neck:      Thyroid: No thyromegaly. Cardiovascular:      Rate and Rhythm: Normal rate and regular rhythm. Pulmonary:      Effort: Pulmonary effort is normal.   Abdominal:      Palpations: Abdomen is soft. Tenderness: There is no abdominal tenderness. Musculoskeletal:      Cervical back: Neck supple. Neurological:      Mental Status: She is alert and oriented to person, place, and time. Skin:     General: Skin is warm and dry. Vitals reviewed. Point of Care Testing:   -urine pregnancy test: positive    Assessment/Plan:   1. Normal well-woman GYN exam.  2. Cervical cancer screening:  Normal cervical exam.  Pap smear not indicated at this time. Has received HPV vaccine in the past.   3. STD screening:  Orders placed for vaginal GC/CT cultures. Orders placed for serum anti-HIV, anti-HCV, HbsAg, syphilis panel. 4. Breast cancer screening:  Normal breast exam.  Reviewed breast self-awareness. 5. Depression Screening: Patient's depression screening was assessed with a PHQ-2 score of 6. Their PHQ-9 score was 27. Patient assessed for underlying major depression. They have no active suicidal ideations. Brief counseling provided and recommend additional follow-up/re-evaluation next office visit. Referral placed for  consultation. 6. BMI Counseling: Body mass index is 29.69 kg/m². No intervention indicated.    7. Tobacco Cessation Counseling: Tobacco cessation counseling and education was provided. The patient is sincerely urged to quit consumption of tobacco. She is not ready to quit tobacco. The numerous health risks of tobacco consumption were discussed. If she decides to quit, there are a number of helpful adjunctive aids, and she can see me to discuss nicotine replacement therapy, chantix, or bupropion anytime in the future. 8. Positive pregnancy test:  Desires termination of pregnancy.  in to see patient to offer resources. Advised if she changes her mind and desires to continue with pregnancy she can just call to make appointment here for dating ultrasound.    9. Return to office in 1 year for annual GYN exam.

## 2023-10-18 NOTE — LETTER
10/19/2023    To J Carlos Ball  : 1990      This letter is to advise you that your recent CULTURES for gonorrhea and chlamydia were reviewed by me and are NORMAL. Please contact the office for an appointment if you have any additional concerns.     [address] [facility] Cheri Eastman

## 2023-10-18 NOTE — PATIENT INSTRUCTIONS
Thank you for your confidence in our team.   We appreciate you and welcome your feedback. If you receive a survey from us, please take a few moments to let us know how we are doing. Sincerely,  ANGELO Mcallister       Cigarette Smoking and Your Health     What are the risks to my health if I smoke tobacco?  Nicotine and other chemicals found in tobacco damage every cell in your body. Even if you are a light smoker, you have an increased risk for cancer, heart disease, and lung disease. If you are pregnant or have diabetes, smoking increases your risk for complications. What are the benefits to my health if I stop smoking? You decrease respiratory symptoms such as coughing, wheezing, and shortness of breath. You reduce your risk for cancers of the lung, mouth, throat, kidney, bladder, pancreas, stomach, and cervix. If you already have cancer, you increase the benefits of chemotherapy. You also reduce your risk for cancer returning or a second cancer from developing. You reduce your risk for heart disease, blood clots, heart attack, and stroke. You reduce your risk for lung infections, and diseases such as pneumonia, asthma, chronic bronchitis, and emphysema. Your circulation improves. More oxygen can be delivered to your body. If you have diabetes, you lower your risk for complications, such as kidney, artery, and eye diseases. You also lower your risk for nerve damage. Nerve damage can lead to amputations, poor vision, and blindness. You improve your body's ability to heal and to fight infections. What are the health benefits to others if I stop smoking? Tobacco is harmful to nonsmokers who breathe in your secondhand smoke. The following are ways the health of others around you may improve when you stop smoking: You lower the risks for lung cancer and heart disease in nonsmoking adults.      If you are pregnant, you lower the risk for miscarriage, early delivery, low birth weight, and stillbirth. You also lower your baby's risk for SIDS, obesity, developmental delay, and neurobehavioral problems, such as ADHD. If you have children, you lower their risk for ear infections, colds, pneumonia, bronchitis, and asthma. How to Stop Smoking     You will improve your health and the health of others around you  if you stop smoking. Your risk for heart and lung disease, cancer, stroke, heart attack, and vision problems will also decrease. You can benefit from quitting no matter how long you have smoked. PREPARE to stop smoking. Nicotine is a highly addictive drug found in cigarettes. Withdrawal symptoms can happen when you stop smoking and make it hard to quit. These include anxiety, depression, irritability, trouble sleeping, and increased appetite. You increase your chances of success if you PREPARE to quit. Set a quit date. Marsha العراقي a date that is within the next 2 weeks. Do not pick a day that you think may be stressful or busy. Write down the day or [place] it on your calender. Tell friends and family that you plan to quit. Explain that you may have withdrawal symptoms when you try to quit. Ask them to support you. They may be able to encourage you and help reduce your stress to make it easier for you to quit. Make a list of your reasons for quitting. Put the list somewhere you will see it every day, such as your refrigerator. You can look at the list when you have a craving. Remove all tobacco and nicotine products from your home, car, and workplace. Also, remove anything else that will tempt you to smoke, such as lighters, matches, or ashtrays. Clean your car, home, and places at work that smell like smoke. The smell of smoke can trigger a craving. Identify triggers that make you want to smoke. This may include activities, feelings, or people. Also write down 1 way you can deal with each of your triggers.  For example, if you want to smoke as soon as you wake up, plan another activity during this time, such as exercise. Make a plan for how you will quit. Learn about the tools that can help you quit, such as medicine, counseling, or nicotine replacement therapy. Choose at least 2 options to help you quit. Tools to help you stop smoking:     Counseling  from a trained healthcare provider can provide you with support and skills to quit smoking. The provider will also teach you to manage your withdrawal symptoms and cravings. You may receive counseling from one counselor, in group therapy, or through phone therapy called a quit line. Nicotine replacement therapy (NRT)  such as nicotine patches, gum, or lozenges may help reduce your nicotine cravings. You may get these without a doctor's order. Do not use e-cigarettes or smokeless tobacco in place of cigarettes or to help you quit. They still contain nicotine. Prescription medicines  such as nasal sprays or nicotine inhalers may help reduce your withdrawal symptoms. Other medicines may also be used to reduce your urge to smoke. Ask your healthcare provider about these medicines. You may need to start certain medicines 2 weeks before your quit date for them to work well. Hypnosis  is a practice that helps guide you through thoughts and feelings. Hypnosis may help decrease your cravings and make you more willing to quit. Acupuncture therapy  uses very thin needles to balance energy channels in the body. This is thought to help decrease cravings and symptoms of nicotine withdrawal.     Support groups  let you talk to others who are trying to quit or have already quit. It may be helpful to speak with others about how they quit. Manage your cravings:     Avoid situations, people, and places that tempt you to smoke. Go to nonsmoking places, such as libraries or restaurants. Understand what tempts you and try to avoid these things. Keep your hands busy. Hold things such as a stress ball or pen.      Put candy or toothpicks in your mouth. Keep lollipops, sugarless gum, or toothpicks with you at all times. Do not have alcohol or caffeine. These drinks may tempt you to smoke. Drink healthy liquids such as water or juice instead. Reward yourself when you resist your cravings. Rewards will motivate you and help you stay positive. Do an activity that distracts you from your craving. Examples include going for a walk, exercising, or cleaning. Prevent weight gain after you quit:  You may gain a few pounds after you quit smoking. It is healthier for you to gain a few pounds than to continue to smoke. The following can help you prevent weight gain:    Eat healthy foods. These include fruits, vegetables, whole-grain breads, low-fat dairy products, beans, lean meats, and fish. Eat healthy snacks, such as low-fat yogurt, if you get hungry between meals. Drink water before, during, and between meals. This will make your stomach feel full and help prevent you from overeating. Ask your healthcare provider how much liquid to drink each day and which liquids are best for you. Exercise. Take a walk or do some kind of exercise every day. Ask your healthcare provider what exercise is right for you. This may help reduce your cravings and reduce stress.

## 2023-10-18 NOTE — PROGRESS NOTES
VINCE SAFIA was referred by ANGELO Milian to see pt in the office today for a PHQ-9 of 27 with an answer "nearly everyday" to thoughts that she would be better off dead or wanting to harm herself. VINCE BAIG is familiar with this patient, we met at her annual exam this time last year. At that time pt also struggling with depression, she was agreeable to f/u with our in house psychiatrist but unfortunately she did not ever meet with the provider. VINCE BAIG met with the pt today, at the time of today's visit she found out that she is pregnant. This is not a desired pregnancy. Pt does plan to terminate the pregnancy. Pt reports that she has a hx of two pregnancy terminations in the last two to three years, [facility] and is aware of the process of how to access an . Pt reports that after her previous abortions she usually feels relieved at first and then does feel emotional after. She reports that she does not plan to tell her partner about there pregnancy or termination because he will not be supportive. VINCE BAIG offered support and let her know that we support whatever decision she makes. VINCE BAIG and Estela Quintero let pt know that we will want to see her approx 2 weeks after the termination to check in on how she is doing, pt verbalizes understanding. VINCE BAIG addressed the PHQ-9, VINCE BAIG asked about the last question, pt seemed surprised hat she answered the question that way and did dial back her answer and reported that she does not feel that way all the time. Pt denies any active plans to end her life, she does struggle with intrusive thoughts of wanting her life to end, she disclosed that at times she will decide to not wear her seatbelt in a car because if she gets in an accident they maybe she will die, but she never actively does something to harm herself.      VINCE BAIG asked pt why she did not f/u with the psychiatry visit we had scheduled last year, pt reports that she is afraid to feel judged, even if it is a provider and their job she is still worried about them judging her. VINCE Baig reassured pt that our providers care for her well being, both physically and emotionally. VINCE BAIG re-assured pt that our [id number] [facility] provider is a female, VINCE BAIG encouraged the pt that if any time she would like to see our psychiatrist we can schedule her. Pt verbalized understanding. VINCE BAGI reviewed the crisis numbers with pt, reviewed to call crisis if she begins to have any active thoughts or plans about ending her life. VINCE BAIG provided her with the number for [facility] [employer] and encouraged her to call them if she needs to talk to anyone for emotional care after her termination. VINCE BAIG will f/u in one week.

## 2023-10-19 LAB
C TRACH DNA SPEC QL NAA+PROBE: NEGATIVE
N GONORRHOEA DNA SPEC QL NAA+PROBE: NEGATIVE

## 2023-10-25 ENCOUNTER — PATIENT OUTREACH (OUTPATIENT)
Dept: OBGYN CLINIC | Facility: CLINIC | Age: 33
End: 2023-10-25

## 2023-10-26 ENCOUNTER — TELEPHONE (OUTPATIENT)
Dept: OBGYN CLINIC | Facility: CLINIC | Age: 33
End: 2023-10-26

## 2023-10-26 NOTE — TELEPHONE ENCOUNTER
Hi, can you please schedule this patient for a new appointment with Dr. René Gonzalez, thank you. Janay Reynolds hours ago (9:42 AM)       VINCE BAIG outreached pt today to check in and see how she is doing, pt did answer, she reports she is doing okay, [facility]. VINCE BAIG offered for her to see psychiatry in our office again, pt is very hesitant to start any MH services, but was agreeable to schedule an appointment with Dr. René Gonzalez, she is more open and willing to start services as long as it is a female provider. She asked VINCE BAIG to call her on 11/09.           Note         MARY KAY Reynolds [phone] hours ago (9:26 AM)         Unable to do telephone call from staff message basket

## 2023-10-26 NOTE — PROGRESS NOTES
VINCE BAIG outreached pt today to check in and see how she is doing, pt did answer, she reports she is doing okay, [facility] this pregnancy. VINCE BAIG offered for her to see psychiatry in our office again, pt is very hesitant to start any MH services, but was agreeable to schedule an appointment with Dr. René Gonzalez, she is more open and willing to start services as long as it is a female provider. She asked VINCE BAIG to call her on 11/09.

## 2023-11-08 ENCOUNTER — HOSPITAL ENCOUNTER (EMERGENCY)
Facility: HOSPITAL | Age: 33
Discharge: HOME | End: 2023-11-08
Attending: EMERGENCY MEDICINE

## 2023-11-08 ENCOUNTER — APPOINTMENT (EMERGENCY)
Dept: ULTRASOUND IMAGING | Facility: HOSPITAL | Age: 33
End: 2023-11-08

## 2023-11-08 VITALS
RESPIRATION RATE: 16 BRPM | HEART RATE: 66 BPM | DIASTOLIC BLOOD PRESSURE: 76 MMHG | WEIGHT: 151.01 LBS | OXYGEN SATURATION: 98 % | SYSTOLIC BLOOD PRESSURE: 119 MMHG | BODY MASS INDEX: 30.5 KG/M2

## 2023-11-08 DIAGNOSIS — O46.90 VAGINAL BLEEDING DURING PREGNANCY: Primary | ICD-10-CM

## 2023-11-08 LAB
ABO GROUP BLD: NORMAL
ABO GROUP BLD: NORMAL
ALBUMIN SERPL BCP-MCNC: 4.3 G/DL (ref 3.5–5)
ALP SERPL-CCNC: 38 U/L (ref 34–104)
ALT SERPL W P-5'-P-CCNC: 16 U/L (ref 7–52)
ANION GAP SERPL CALCULATED.3IONS-SCNC: 6 MMOL/L
AST SERPL W P-5'-P-CCNC: 14 U/L (ref 13–39)
B-HCG SERPL-ACNC: 3330 MIU/ML (ref 0–5)
BACTERIA UR QL AUTO: ABNORMAL /HPF
BASOPHILS # BLD AUTO: 0.07 THOUSANDS/ÂΜL (ref 0–0.1)
BASOPHILS NFR BLD AUTO: 1 % (ref 0–1)
BILIRUB SERPL-MCNC: 0.32 MG/DL (ref 0.2–1)
BILIRUB UR QL STRIP: NEGATIVE
BLD GP AB SCN SERPL QL: NEGATIVE
BUN SERPL-MCNC: 6 MG/DL (ref 5–25)
CALCIUM SERPL-MCNC: 8.9 MG/DL (ref 8.4–10.2)
CHLORIDE SERPL-SCNC: 106 MMOL/L (ref 96–108)
CLARITY UR: ABNORMAL
CO2 SERPL-SCNC: 26 MMOL/L (ref 21–32)
COLOR UR: YELLOW
CREAT SERPL-MCNC: 0.64 MG/DL (ref 0.6–1.3)
EOSINOPHIL # BLD AUTO: 0.33 THOUSAND/ÂΜL (ref 0–0.61)
EOSINOPHIL NFR BLD AUTO: 4 % (ref 0–6)
ERYTHROCYTE [DISTWIDTH] IN BLOOD BY AUTOMATED COUNT: 13.2 % (ref 11.6–15.1)
EXT PREGNANCY TEST URINE: POSITIVE
EXT. CONTROL: ABNORMAL
GFR SERPL CREATININE-BSD FRML MDRD: 117 ML/MIN/1.73SQ M
GLUCOSE SERPL-MCNC: 91 MG/DL (ref 65–140)
GLUCOSE UR STRIP-MCNC: NEGATIVE MG/DL
HCT VFR BLD AUTO: 43.1 % (ref 34.8–46.1)
HGB BLD-MCNC: 14.2 G/DL (ref 11.5–15.4)
HGB UR QL STRIP.AUTO: ABNORMAL
IMM GRANULOCYTES # BLD AUTO: 0.02 THOUSAND/UL (ref 0–0.2)
IMM GRANULOCYTES NFR BLD AUTO: 0 % (ref 0–2)
KETONES UR STRIP-MCNC: NEGATIVE MG/DL
LEUKOCYTE ESTERASE UR QL STRIP: ABNORMAL
LYMPHOCYTES # BLD AUTO: 1.53 THOUSANDS/ÂΜL (ref 0.6–4.47)
LYMPHOCYTES NFR BLD AUTO: 18 % (ref 14–44)
MCH RBC QN AUTO: 30.3 PG (ref 26.8–34.3)
MCHC RBC AUTO-ENTMCNC: 32.9 G/DL (ref 31.4–37.4)
MCV RBC AUTO: 92 FL (ref 82–98)
MONOCYTES # BLD AUTO: 0.53 THOUSAND/ÂΜL (ref 0.17–1.22)
MONOCYTES NFR BLD AUTO: 6 % (ref 4–12)
MUCOUS THREADS UR QL AUTO: ABNORMAL
NEUTROPHILS # BLD AUTO: 5.98 THOUSANDS/ÂΜL (ref 1.85–7.62)
NEUTS SEG NFR BLD AUTO: 71 % (ref 43–75)
NITRITE UR QL STRIP: NEGATIVE
NON-SQ EPI CELLS URNS QL MICRO: ABNORMAL /HPF
NRBC BLD AUTO-RTO: 0 /100 WBCS
PH UR STRIP.AUTO: 7.5 [PH] (ref 4.5–8)
PLATELET # BLD AUTO: 347 THOUSANDS/UL (ref 149–390)
PMV BLD AUTO: 9.1 FL (ref 8.9–12.7)
POTASSIUM SERPL-SCNC: 3.8 MMOL/L (ref 3.5–5.3)
PROT SERPL-MCNC: 6.5 G/DL (ref 6.4–8.4)
PROT UR STRIP-MCNC: ABNORMAL MG/DL
RBC # BLD AUTO: 4.69 MILLION/UL (ref 3.81–5.12)
RBC #/AREA URNS AUTO: ABNORMAL /HPF
RH BLD: NEGATIVE
RH BLD: NEGATIVE
SODIUM SERPL-SCNC: 138 MMOL/L (ref 135–147)
SP GR UR STRIP.AUTO: 1.02 (ref 1–1.03)
SPECIMEN EXPIRATION DATE: NORMAL
UROBILINOGEN UR QL STRIP.AUTO: 0.2 E.U./DL
WBC # BLD AUTO: 8.46 THOUSAND/UL (ref 4.31–10.16)
WBC #/AREA URNS AUTO: ABNORMAL /HPF

## 2023-11-08 PROCEDURE — 76801 OB US < 14 WKS SINGLE FETUS: CPT

## 2023-11-08 PROCEDURE — 81001 URINALYSIS AUTO W/SCOPE: CPT

## 2023-11-08 PROCEDURE — 81025 URINE PREGNANCY TEST: CPT

## 2023-11-08 PROCEDURE — 85025 COMPLETE CBC W/AUTO DIFF WBC: CPT

## 2023-11-08 PROCEDURE — 80053 COMPREHEN METABOLIC PANEL: CPT

## 2023-11-08 PROCEDURE — 36415 COLL VENOUS BLD VENIPUNCTURE: CPT

## 2023-11-08 PROCEDURE — 84702 CHORIONIC GONADOTROPIN TEST: CPT

## 2023-11-08 PROCEDURE — 99285 EMERGENCY DEPT VISIT HI MDM: CPT

## 2023-11-08 PROCEDURE — 86901 BLOOD TYPING SEROLOGIC RH(D): CPT

## 2023-11-08 PROCEDURE — 86850 RBC ANTIBODY SCREEN: CPT

## 2023-11-08 PROCEDURE — 86900 BLOOD TYPING SEROLOGIC ABO: CPT

## 2023-11-08 PROCEDURE — 99284 EMERGENCY DEPT VISIT MOD MDM: CPT

## 2023-11-08 PROCEDURE — 96372 THER/PROPH/DIAG INJ SC/IM: CPT

## 2023-11-08 RX ORDER — ACETAMINOPHEN 325 MG/1
975 TABLET ORAL ONCE
Status: COMPLETED | OUTPATIENT
Start: 2023-11-08 | End: 2023-11-08

## 2023-11-08 RX ADMIN — ACETAMINOPHEN 325MG 975 MG: 325 TABLET ORAL at 10:41

## 2023-11-08 RX ADMIN — HUMAN RHO(D) IMMUNE GLOBULIN 300 MCG: 300 INJECTION, SOLUTION INTRAMUSCULAR at 15:33

## 2023-11-08 NOTE — ED PROVIDER NOTES
History  Chief Complaint   Patient presents with    Vaginal Bleeding     Pt reports since Saturday with vaginal bleeding and cramping. Pt 6-7 weeks pregnant     Patient is a 77-year-old female currently about 6 weeks pregnant presenting for evaluation of vaginal bleeding. States she had a positive pregnancy test on 10/18/23. Last menstrual period 2023. Reports using about 4-5 pads a day with some passage of clots. Patent and fully saturated. She has associated lower abdominal cramping and describes it as similar to menstrual cramps. No fevers, chest pain, shortness of breath, nausea, vomiting, diarrhea, constipation, dysuria, hematuria. No lightheadedness. No history of bleeding disorders. She has carried 2 pregnancies to term and believes she has had 2 spontaneous miscarriages and 2 abortions. No medications prior to arrival.  No prior abdominal surgeries. Prior to Admission Medications   Prescriptions Last Dose Informant Patient Reported? Taking? Multiple Vitamins-Minerals (multivitamin with minerals) tablet   Yes Yes   Sig: Take 1 tablet by mouth daily      Facility-Administered Medications: None       Past Medical History:   Diagnosis Date    Depression        Past Surgical History:   Procedure Laterality Date    ABDOMINOPLASTY  2015    BREAST IMPLANT Bilateral 2016     SECTION  2008       Family History   Problem Relation Age of Onset    Depression Mother     Breast cancer Paternal Grandmother     Colon cancer Neg Hx     Ovarian cancer Neg Hx      I have reviewed and agree with the history as documented.     E-Cigarette/Vaping     E-Cigarette/Vaping Substances     Social History     Tobacco Use    Smoking status: Every Day     Packs/day: 0.25     Types: Cigarettes    Smokeless tobacco: Never   Substance Use Topics    Alcohol use: Yes     Comment: couple times/year    Drug use: Yes     Types: Marijuana     Comment: couple times/week       Review of Systems   Constitutional: Negative for chills and fever. HENT:  Negative for ear pain and sore throat. Eyes:  Negative for pain and visual disturbance. Respiratory:  Negative for cough and shortness of breath. Cardiovascular:  Negative for chest pain and palpitations. Gastrointestinal:  Positive for abdominal pain. Negative for diarrhea, nausea and vomiting. Genitourinary:  Positive for vaginal bleeding. Negative for dysuria, flank pain, hematuria and vaginal discharge. Musculoskeletal:  Negative for arthralgias and back pain. Skin:  Negative for color change and rash. Neurological:  Negative for seizures and syncope. All other systems reviewed and are negative. Physical Exam  Physical Exam  Vitals and nursing note reviewed. Constitutional:       General: She is not in acute distress. Appearance: Normal appearance. She is not ill-appearing or toxic-appearing. HENT:      Head: Normocephalic and atraumatic. Right Ear: Tympanic membrane, ear canal and external ear normal.      Left Ear: Tympanic membrane, ear canal and external ear normal.      Nose: Nose normal.      Mouth/Throat:      Mouth: Mucous membranes are moist.      Pharynx: No posterior oropharyngeal erythema. Eyes:      General: No scleral icterus. Right eye: No discharge. Left eye: No discharge. Extraocular Movements: Extraocular movements intact. Conjunctiva/sclera: Conjunctivae normal.   Cardiovascular:      Rate and Rhythm: Normal rate and regular rhythm. Pulses: Normal pulses. Heart sounds: Normal heart sounds. Pulmonary:      Effort: Pulmonary effort is normal. No respiratory distress. Breath sounds: Normal breath sounds. Abdominal:      Palpations: Abdomen is soft. Tenderness: There is abdominal tenderness in the right lower quadrant, suprapubic area and left lower quadrant. There is no right CVA tenderness, left CVA tenderness, guarding or rebound.    Musculoskeletal:         General: No tenderness, deformity or signs of injury. Cervical back: Normal range of motion and neck supple. Skin:     General: Skin is dry. Coloration: Skin is not jaundiced. Findings: No erythema or rash. Neurological:      General: No focal deficit present. Mental Status: She is alert and oriented to person, place, and time. Mental status is at baseline. Motor: No weakness. Gait: Gait normal.   Psychiatric:         Mood and Affect: Mood normal.         Behavior: Behavior normal.         Thought Content: Thought content normal.         Vital Signs  ED Triage Vitals   Temp Pulse Respirations Blood Pressure SpO2   -- 11/08/23 0932 11/08/23 0932 11/08/23 0932 11/08/23 0932    100 18 124/82 99 %      Temp src Heart Rate Source Patient Position - Orthostatic VS BP Location FiO2 (%)   -- 11/08/23 0932 11/08/23 0932 11/08/23 0932 --    Monitor Sitting Right arm       Pain Score       11/08/23 1041       6           Vitals:    11/08/23 0932 11/08/23 1426   BP: 124/82 119/76   Pulse: 100 66   Patient Position - Orthostatic VS: Sitting Lying         Visual Acuity      ED Medications  Medications   acetaminophen (TYLENOL) tablet 975 mg (975 mg Oral Given 11/8/23 1041)   Rho(D) immune globulin (RHOGAM ULTRA-FILTERED PLUS) IM injection 300 mcg (300 mcg Intramuscular Given 11/8/23 1533)       Diagnostic Studies  Results Reviewed       Procedure Component Value Units Date/Time    Quantitative hCG [[id number]]  (Abnormal) Collected: 11/08/23 1024    Lab Status: Final result Specimen: Blood from Arm, Right Updated: 11/08/23 1146     HCG, Quant 3,330 mIU/mL     Narrative:       Expected Ranges:    HCG results between 5 and 25 mIU/mL may be indicative of early pregnancy but should be interpreted in light of the total clinical presentation. HCG can rise to detectable levels in mitch and post menopausal women (0-11.6 mIU/mL).      Approximate               Approximate HCG  Gestation age          Concentration ( mIU/mL)  _____________          ______________________   Linda Sanya                      HCG values  0.2-1                       5-50  1-2                           2-3                         100-5000  3-4                         500-41926  4-5                         1000-74622  5-6                         [id number]  6-8                         [id number]  8-12                        78522-415661      Urine Microscopic [[id number]]  (Abnormal) Collected: 11/08/23 1033    Lab Status: Final result Specimen: Urine, Clean Catch Updated: 11/08/23 1112     RBC, UA Innumerable /hpf      WBC, UA 2-4 /hpf      Epithelial Cells Occasional /hpf      Bacteria, UA Occasional /hpf      MUCUS THREADS Occasional    Comprehensive metabolic panel [[id number]] Collected: 11/08/23 1024    Lab Status: Final result Specimen: Blood from Arm, Right Updated: 11/08/23 1056     Sodium 138 mmol/L      Potassium 3.8 mmol/L      Chloride 106 mmol/L      CO2 26 mmol/L      ANION GAP 6 mmol/L      BUN 6 mg/dL      Creatinine 0.64 mg/dL      Glucose 91 mg/dL      Calcium 8.9 mg/dL      AST 14 U/L      ALT 16 U/L      Alkaline Phosphatase 38 U/L      Total Protein 6.5 g/dL      Albumin 4.3 g/dL      Total Bilirubin 0.32 mg/dL      eGFR 117 ml/min/1.73sq m     Narrative:      [facility] guidelines for Chronic Kidney Disease (CKD):     Stage 1 with normal or high GFR (GFR > 90 mL/min/1.73 square meters)    Stage 2 Mild CKD (GFR = 60-89 mL/min/1.73 square meters)    Stage 3A Moderate CKD (GFR = 45-59 mL/min/1.73 square meters)    Stage 3B Moderate CKD (GFR = 30-44 mL/min/1.73 square meters)    Stage 4 Severe CKD (GFR = 15-29 mL/min/1.73 square meters)    Stage 5 End Stage CKD (GFR <15 mL/min/1.73 square meters)  Note: GFR calculation is accurate only with a steady state creatinine    CBC and differential [[id number]] Collected: 11/08/23 1024    Lab Status: Final result Specimen: Blood from Arm, Right Updated: 11/08/23 1039     WBC 8.46 Thousand/uL      RBC 4.69 Million/uL      Hemoglobin 14.2 g/dL      Hematocrit 43.1 %      MCV 92 fL      MCH 30.3 pg      MCHC 32.9 g/dL      RDW 13.2 %      MPV 9.1 fL      Platelets 164 Thousands/uL      nRBC 0 /100 WBCs      Neutrophils Relative 71 %      Immat GRANS % 0 %      Lymphocytes Relative 18 %      Monocytes Relative 6 %      Eosinophils Relative 4 %      Basophils Relative 1 %      Neutrophils Absolute 5.98 Thousands/µL      Immature Grans Absolute 0.02 Thousand/uL      Lymphocytes Absolute 1.53 Thousands/µL      Monocytes Absolute 0.53 Thousand/µL      Eosinophils Absolute 0.33 Thousand/µL      Basophils Absolute 0.07 Thousands/µL     POCT pregnancy, urine [[id number]]  (Abnormal) Resulted: 11/08/23 1036    Lab Status: Final result Updated: 11/08/23 1036     EXT Preg Test, Ur Positive     Control Valid    Urine Macroscopic, POC [[id number]]  (Abnormal) Collected: 11/08/23 1033    Lab Status: Final result Specimen: Urine Updated: 11/08/23 1034     Color, UA Yellow     Clarity, UA Slightly Cloudy     pH, UA 7.5     Leukocytes, UA Trace     Nitrite, UA Negative     Protein, UA 30 (1+) mg/dl      Glucose, UA Negative mg/dl      Ketones, UA Negative mg/dl      Urobilinogen, UA 0.2 E.U./dl      Bilirubin, UA Negative     Occult Blood, UA Large     Specific Gravity, UA 1.020    Narrative:      CLINITEK RESULT                   US OB < 14 weeks with transvaginal   Final Result by Fortunato Vitale MD (11/08 1316)      Single intrauterine gestation corresponding to 6 weeks 1 day gestational age with PRABHAKAR of 7/2/2024. No fetal cardiac activity. Correlation with serial beta hCG and short-term pelvic ultrasound follow-up in 14 days is advised. This study demonstrates a significant  finding and was documented as such in [facility] for liaison and referring practitioner notification.          Workstation performed: [id number]                    Procedures  Procedures         ED Course  ED Course as of 11/08/23 1537   Wed Nov 08, 2023   1001 LMP 9/22/23.   1053 CBC and differential  No leukocytosis, anemia. 1053 PREGNANCY TEST URINE(!): Positive   1053 Urine Macroscopic, POC(!)  Large blood likely vaginal. Trace leukocytes however no nitrites. In absence of urinary symptoms, will not treat and send for culture. 1148 Rh Factor: Negative  Will give rhogam.   9646 Blood product consent signed. Will place order for rhogam. US pending. 1312 HCG QUANTITATIVE(!): 3,330  C/w 3-5 weeks. Will correlate with US if visualized. [address] < 14 weeks with transvaginal  IMPRESSION:     Single intrauterine gestation corresponding to 6 weeks 1 day gestational age with PRABHAKAR of 7/2/2024. No fetal cardiac activity. Correlation with serial beta hCG and short-term pelvic ultrasound follow-up in 14 days is advised. 1424 TT sent to [facility].   1522 Per OB resident Dr. Rock Weeks: Based on crown ramp length measurement, not yet diagnostic of pregnancy failure. No immediate interventions required in absence of hemorrhage and stable vitals. No 2 day quant required. Repeat US and outpatient follow up in 2 weeks. Medical Decision Making  Patient is a 31-year-old female presenting for evaluation of vaginal bleeding in early pregnancy. Denies significant hemorrhage. Some lower abdominal cramping. Vitals are stable on arrival.  Physical exam remarkable for mild tenderness to the lower abdomen without rebound or guarding. DDx including but not limited to: Miscarriage, threatened miscarriage, ectopic pregnancy, ovarian pathology. Plan: CBC, CMP, quantitative hCG, type and screen, pelvic ultrasound. Will treat symptomatically with Tylenol. Labs negative for leukocytosis, anemia, lecture abnormalities, JACKIE. Patient is Rh- so gave her RhoGAM.  hCG quantitative is 3330.   Urine has large blood and trace leukocytes, no other evidence of infection and patient is asymptomatic so we will not treat at this time. Ultrasound shows single intrauterine gestation corresponding to 6 weeks 1 day gestational age. No fetal cardiac activity detected. Discussed with OB resident Dr. Andrea Dietz ED course for details. Patient stable for discharge home with outpatient follow-up and repeat ultrasound. I have discussed findings and plan for discharge with the patient/caregiver. Follow up with the appropriate providers including primary care physician was discussed. Return precautions discussed with patient/caregiver as outlined in AVS. Patient/caregiver verbally expressed understanding. Patient stable at time of discharge and ambulated out of the emergency department. Amount and/or Complexity of Data Reviewed  Labs: ordered. Decision-making details documented in ED Course. Radiology: ordered. Decision-making details documented in ED Course. Risk  OTC drugs. Prescription drug management. Disposition  Final diagnoses:   Vaginal bleeding during pregnancy     Time reflects when diagnosis was documented in both MDM as applicable and the Disposition within this note       Time User Action Codes Description Comment    11/8/2023  2:29 PM Clora Aniceto Add [O03.9] Spontaneous miscarriage     11/8/2023  2:32 PM Clora Aniceto Remove [O03.9] Spontaneous miscarriage     11/8/2023  2:33 PM Clora Nash Add [O46.90] Vaginal bleeding during pregnancy           ED Disposition       ED Disposition   Discharge    Condition   Stable    Date/Time   Wed Nov 8, 2023  2:27 PM    Comment   Michael Harden discharge to home/self care.                    Follow-up Information       Follow up With Specialties Details Why Contact Info Additional [address] [facility] Schedule an appointment as soon as possible for a visit   [address] [address]  [address] [place] [address] [address] [place] [address], [id number] [phone]            Discharge Medication List as of 11/8/2023  2:35 PM        CONTINUE these medications which have NOT CHANGED    Details   Multiple Vitamins-Minerals (multivitamin with minerals) tablet Take 1 tablet by mouth daily, Historical Med             No discharge procedures on file.     PDMP Review       None            ED Provider  Electronically Signed by             Jarrod Stephenson PA-C  11/08/23 2749

## 2023-11-08 NOTE — DISCHARGE INSTRUCTIONS
Follow up with OB within 2 weeks for repeat US and further management. If you develop heavy bleeding (1maxipad per hour), fever, worsening pain then please return to ED.

## 2023-11-08 NOTE — Clinical Note
Angelina Del Rosario was seen and treated in our emergency department on 11/8/2023. Diagnosis:     Tuyet Núñez  may return to work on return date. She may return on this date: 11/09/2023         If you have any questions or concerns, please don't hesitate to call.       Jacobo Malik PA-C    ______________________________           _______________          _______________  Hospital Representative                              Date                                Time

## 2023-11-08 NOTE — ED NOTES
Spoke with blood bank, stated that Rhogam will be ready shortly.  Awaiting rhogam at this time     Liset Gonzalez RN  11/08/23 5220

## 2023-11-27 ENCOUNTER — OFFICE VISIT (OUTPATIENT)
Dept: OBGYN CLINIC | Facility: CLINIC | Age: 33
End: 2023-11-27

## 2023-11-27 VITALS
BODY MASS INDEX: 30.82 KG/M2 | WEIGHT: 152.6 LBS | DIASTOLIC BLOOD PRESSURE: 83 MMHG | HEART RATE: 85 BPM | SYSTOLIC BLOOD PRESSURE: 114 MMHG

## 2023-11-27 DIAGNOSIS — F43.10 PTSD (POST-TRAUMATIC STRESS DISORDER): ICD-10-CM

## 2023-11-27 DIAGNOSIS — F41.1 GENERALIZED ANXIETY DISORDER WITH PANIC ATTACKS: ICD-10-CM

## 2023-11-27 DIAGNOSIS — F33.2 SEVERE EPISODE OF RECURRENT MAJOR DEPRESSIVE DISORDER, WITHOUT PSYCHOTIC FEATURES (HCC): Primary | ICD-10-CM

## 2023-11-27 DIAGNOSIS — F41.0 GENERALIZED ANXIETY DISORDER WITH PANIC ATTACKS: ICD-10-CM

## 2023-11-27 PROCEDURE — NC001 PR NO CHARGE: Performed by: PSYCHIATRY & NEUROLOGY

## 2023-11-27 RX ORDER — MIRTAZAPINE 7.5 MG/1
7.5 TABLET, FILM COATED ORAL
Qty: 30 TABLET | Refills: 0 | Status: SHIPPED | OUTPATIENT
Start: 2023-11-27 | End: 2023-12-27

## 2023-11-27 RX ORDER — HYDROXYZINE HYDROCHLORIDE 25 MG/1
25 TABLET, FILM COATED ORAL EVERY 6 HOURS PRN
Qty: 60 TABLET | Refills: 0 | Status: SHIPPED | OUTPATIENT
Start: 2023-11-27 | End: 2023-12-27

## 2023-11-27 NOTE — PROGRESS NOTES
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  Aware of need to follow up with family physician for medical issues  Aware of 24 hour and weekend coverage for urgent situations accessed by calling [facility] main practice number    Medications Risks/Benefits:      Risks, Benefits And Possible Side Effects Of Medications:    Risks, benefits, and possible side effects of medications explained to [facility] and she verbalizes understanding and agreement for treatment.        Controlled Medication Discussion:     Not applicable    Treatment Plan:    Completed and signed during the session: Yes - Treatment Plan done but not signed at time of office visit due to:  Plan reviewed in person and verbal consent given due to [address] [place] [address] distancing    This note was not shared with the patient due to reasonable likelihood of causing patient harm      Basilio Guevara DO 11/27/23

## 2023-11-27 NOTE — PROGRESS NOTES
TREATMENT PLAN        [facility]    Name and Date of Birth:  Edin Castillo 35 y.o. 1990  Date of Treatment Plan: November 27, 2023  Diagnosis/Diagnoses:    1. Severe episode of recurrent major depressive disorder, without psychotic features ([address])    2. Generalized anxiety disorder with panic attacks    3. PTSD (post-traumatic stress disorder)        Strengths/Personal Resources for Self-Care: supportive family, ability to communicate well, ability to listen, ability to understand psychiatric illness, general fund of knowledge, good physical health, motivation for treatment, ability to negotiate basic needs, willingness to work on problems    Area/Areas of need: anxiety symptoms, depressive symptoms    Long Term Goal: improve control of depression and anxiety symptoms   Target Date: 6 months - May 27, 2024  Person/Persons responsible for completion of goal: Bisi Ramesh and [employer], DO     Short Term Objective (s) - How will we reach this goal?:   Take medications as prescribed  Attend psychiatry appointments regularly  Follow up with medical providers  Continue to try to find a talk therapist  Practice coping skills  Avoid alcohol   Eat a healthy diet   Take walks regularly  Spend more time with friends and family  Try breathing exercises  Try relaxation techniques  Target Date: 6 months - May 27, 2024  Person/Persons Responsible for Completion of Goal: Pooja     Progress Towards Goals: Continuing treatment    Treatment Modality: medication management every 1-3 months as needed, consider starting psychotherapy in the future, and follow up with PCP  Review due 180 days from date of this plan: May 25, 2024   Expected length of service: Ongoing treatment    My physician and I have developed this plan together, and I agree to work on the goals and objectives. I understand the treatment goals that were developed for my treatment.     The treatment plan was created between Janeth Moreland DO and Everitt Rinne on 11/27/23 but not signed at the time of the visit due to [address] [place] [address] distancing. The plan was reviewed, and verbal consent was given.

## 2023-11-27 NOTE — PATIENT INSTRUCTIONS
MEDICATION CHANGES: Start taking Remeron 7.5 mg at bedtime. Please start taking Atarax 25 mg every 6 hours as needed. Do not exceed more than 100 mg in a day. Please call the office nursing staff for medication issues including refills, problems getting medications, bothersome side effects, etc., at [phone]. Please return for a follow up appointment as discussed and arrive approximately 15 minutes prior to your appointment time. If you are running late or are unable to attend your appointment, please call our [facility] office at [phone] (fax: [phone]). Look up "grounding techniques" and/or "anchoring demonstration" online and try a few to see what may work for you. Practice these skills before you need them, when you are not feeling too anxious or triggered. You can also search for free guided meditation videos online to help improve your head space when you are feeling very anxious or triggered. Recommendations regarding insomnia:  Wake-up at the same time every day  Refrain from "napping". Refrain from going to bed unless you're tired  Utilize your bedroom for sleep only. Avoid use of electronics including television and/or cellphone/computers. Refrain from use of electronics including television and/or cellphones/computers prior to bed  Turn your alarm clock away so the light is not visible. Attempt relaxation using various means like reading if you're restless in bed for approximately 15-20 minutes. Participate in regular physical activities like exercise, although avoid approximately 3-4 hours prior to bed. Morning exercise is ideal.  Avoid caffeine use prior to bedtime. Consider tapering down excessive use of caffeine. Avoid tobacco use prior to bedtime. Avoid alcohol use prior to bedtime.   Consider reading "No More Sleepless nights" by Basilio Ramírez, Ph.D.  Consider use of online resources including:  http://[employer]/cbt-online-insomnia-treatment.html  Adly. com  CBT-I  Robert on your Smart Phone. Go! To Sleep by the [facility]. Healthy Diet   The American Heart Association and the [employer] of Cardiology have long recommended a healthy diet for not only patients who are at risk for atherosclerotic cardiovascular disease (ASCVD) but also the general public. In keeping with this evidence-based recommendation, the "2018 Guideline on the Management of Blood Cholesterol" stresses that a healthy diet should include adequate intake of these essentials:   Vegetables, fruits, and whole grains   Legumes and nuts   Low-fat dairy products   Low-fat poultry (without the skin)   Fish and seafood   Nontropical vegetable oils     The recent guidelines do provide room for cultural food preferences in a healthy diet, but in general, all patients should limit their intake of saturated and avoid all trans fats, sweets, sugar-sweetened beverages, and red meats. Please maintain adequate hydration of at least 2 Liters of water per day, and improve nutrition by decreasing portion sizes, avoiding fried foods, fast foods, inappropriate snacking and overly processed and packaged items with 'added sugars' (whether in drinks or foods), and observing nutritional facts information on any items that are packaged to reduce intake of saturated fats and AVOID trans fats as mentioned above. Again, consume whole foods such as vegetables, higher lean protein intake, and using healthier lifestyle plans such as the Mediterranean diet with healthier fats such as those from seeds, nuts, and using organic extra virgin olive oil or avocado oil in lieu of processed vegetable oils or margarine. Physical Activity   Recommended DAILY exercise for at least 150 minutes of moderate exercise weekly!   In addition to a healthy diet, all patients should include regular physical activity in their weekly routines, at moderate to vigorous intensity. Any activity is better than nothing, so if your patients can't meet the recommendation of vigorous activity, moderate-intensity activity can still help them reduce their risk of ASCVD. Below are the American Heart Association's recommendations for physical activity per week (preferably spread throughout the week): For Overall Cardiovascular Health and Lowering Cholesterol   At least 150 minutes of moderate-intensity physical activity (for example, 30 minutes, 5 days a week), or   At least 75 minutes of vigorous-intensity physical activity (for example, 25 minutes, 3 days a week); or   A combination of moderate- and vigorous-intensity aerobic activity, and   At least 2 days of moderate- to high-intensity muscle-strengthening activities (such as resistance weight training) for additional health benefits    Weight Control   It's important to work with patients to help them reach and maintain a healthy weight (Table 3). You may need to suggest that they adjust their caloric intake to avoid weight gain or, in overweight and obese patients, to promote weight loss. Table 3. Body Mass Index           If you have thoughts of harming yourself or are otherwise in psychological crisis, do not hesitate to contact your [facility] hotline, or 911 or go to the nearest emergency room. Adult Crisis Numbers  Suicide Prevention Hotline - Dial 9-8-8  [facility]: [phone] or [address] [place] [address] Avenue: [facility] [address] [facility] [address]: [address]: [phone]  [address] [place] [address] Street: [phone] or [phone]  [facility]: [address]: [phone] or [facility] AND [facility]IL[facility]EN[facility]S Lists of hospitals in the [place] Crisis: [address]: [phone] (daytime).        [phone] (after hours, weekends, holidays)     Child/Adolescent Crisis Numbers   [facility] CHILDRENS Lists of hospitals in the [place]: [address]: [address] [place], [place]: [phone]   [place]: [phone]  Please note: Some counties do not have a separate number for Child/Adolescent specific crisis. If your county is not listed under Child/Adolescent, please call the adult number for your county     National Talk to Text Line   All Ages - [address]: [phone] or call 342.

## 2023-11-29 DIAGNOSIS — O03.9 MISCARRIAGE: Primary | ICD-10-CM

## 2023-12-14 ENCOUNTER — TELEPHONE (OUTPATIENT)
Dept: OBGYN CLINIC | Facility: CLINIC | Age: 33
End: 2023-12-14

## 2023-12-18 ENCOUNTER — OFFICE VISIT (OUTPATIENT)
Dept: OBGYN CLINIC | Facility: CLINIC | Age: 33
End: 2023-12-18

## 2023-12-18 VITALS
HEIGHT: 59 IN | SYSTOLIC BLOOD PRESSURE: 129 MMHG | BODY MASS INDEX: 32.7 KG/M2 | WEIGHT: 162.2 LBS | DIASTOLIC BLOOD PRESSURE: 94 MMHG | HEART RATE: 86 BPM

## 2023-12-18 DIAGNOSIS — F33.2 SEVERE EPISODE OF RECURRENT MAJOR DEPRESSIVE DISORDER, WITHOUT PSYCHOTIC FEATURES (HCC): Primary | ICD-10-CM

## 2023-12-18 DIAGNOSIS — F41.1 GENERALIZED ANXIETY DISORDER WITH PANIC ATTACKS: ICD-10-CM

## 2023-12-18 DIAGNOSIS — F43.10 PTSD (POST-TRAUMATIC STRESS DISORDER): ICD-10-CM

## 2023-12-18 DIAGNOSIS — F41.0 GENERALIZED ANXIETY DISORDER WITH PANIC ATTACKS: ICD-10-CM

## 2023-12-18 PROCEDURE — NC001 PR NO CHARGE: Performed by: PSYCHIATRY & NEUROLOGY

## 2023-12-18 RX ORDER — MIRTAZAPINE 15 MG/1
15 TABLET, FILM COATED ORAL
Qty: 30 TABLET | Refills: 0 | Status: SHIPPED | OUTPATIENT
Start: 2023-12-18 | End: 2024-01-17
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
## 2023-12-18 NOTE — PATIENT INSTRUCTIONS
"MEDICATION CHANGE: Start taking 15 mg Remeron at bedtime.  Please call the office nursing staff for medication issues including refills, problems getting medications, bothersome side effects, etc., at [phone].     Please return for a follow up appointment as discussed and arrive approximately 15 minutes prior to your appointment time. If you are running late or are unable to attend your appointment, please call our [place] office at [phone] (fax: [phone]).    Look up \"grounding techniques\" and/or \"anchoring demonstration\" online and try a few to see what may work for you. Practice these skills before you need them, when you are not feeling too anxious or triggered. You can also search for free guided meditation videos online to help improve your head space when you are feeling very anxious or triggered.    Recommendations regarding insomnia:  Wake-up at the same time every day  Refrain from \"napping\".  Refrain from going to bed unless you're tired  Utilize your bedroom for sleep only.  Avoid use of electronics including television and/or cellphone/computers.  Refrain from use of electronics including television and/or cellphones/computers prior to bed  Turn your alarm clock away so the light is not visible.  Attempt relaxation using various means like reading if you're restless in bed for approximately 15-20 minutes.  Participate in regular physical activities like exercise, although avoid approximately 3-4 hours prior to bed.  Morning exercise is ideal.  Avoid caffeine use prior to bedtime.  Consider tapering down excessive use of caffeine.  Avoid tobacco use prior to bedtime.  Avoid alcohol use prior to bedtime.  Consider reading \"No More Sleepless nights\" by Alexi Rosas, Ph.D.  Consider use of online resources including:  http://INVERMART/cbt-online-insomnia-treatment.html  http://www.Take5  CBT-I  Robert on your Smart Phone.  Go! To Sleep by the [facility].    Healthy Diet   The " "American Heart Association and the American College of Cardiology have long recommended a healthy diet for not only patients who are at risk for atherosclerotic cardiovascular disease (ASCVD) but also the general public. In keeping with this evidence-based recommendation, the \"2018 Guideline on the Management of Blood Cholesterol\" stresses that a healthy diet should include adequate intake of these essentials:   Vegetables, fruits, and whole grains   Legumes and nuts   Low-fat dairy products   Low-fat poultry (without the skin)   Fish and seafood   Nontropical vegetable oils     The recent guidelines do provide room for cultural food preferences in a healthy diet, but in general, all patients should limit their intake of saturated and avoid all trans fats, sweets, sugar-sweetened beverages, and red meats.  Please maintain adequate hydration of at least 2 Liters of water per day, and improve nutrition by decreasing portion sizes, avoiding fried foods, fast foods, inappropriate snacking and overly processed and packaged items with 'added sugars' (whether in drinks or foods), and observing nutritional facts information on any items that are packaged to reduce intake of saturated fats and AVOID trans fats as mentioned above. Again, consume whole foods such as vegetables, higher lean protein intake, and using healthier lifestyle plans such as the Mediterranean diet with healthier fats such as those from seeds, nuts, and using organic extra virgin olive oil or avocado oil in lieu of processed vegetable oils or margarine.    Physical Activity   Recommended DAILY exercise for at least 150 minutes of moderate exercise weekly!  In addition to a healthy diet, all patients should include regular physical activity in their weekly routines, at moderate to vigorous intensity. Any activity is better than nothing, so if your patients can't meet the recommendation of vigorous activity, moderate-intensity activity can still help them " reduce their risk of ASCVD.     Below are the American Heart Association's recommendations for physical activity per week (preferably spread throughout the week):     For Overall Cardiovascular Health and Lowering Cholesterol   At least 150 minutes of moderate-intensity physical activity (for example, 30 minutes, 5 days a week), or   At least 75 minutes of vigorous-intensity physical activity (for example, 25 minutes, 3 days a week); or   A combination of moderate- and vigorous-intensity aerobic activity, and   At least 2 days of moderate- to high-intensity muscle-strengthening activities (such as resistance weight training) for additional health benefits    Weight Control   It's important to work with patients to help them reach and maintain a healthy weight (Table 3). You may need to suggest that they adjust their caloric intake to avoid weight gain or, in overweight and obese patients, to promote weight loss.     Table 3. Body Mass Index           If you have thoughts of harming yourself or are otherwise in psychological crisis, do not hesitate to contact your [facility] Crisis hotline, or 911 or go to the nearest emergency room.  Adult Crisis Numbers  Suicide Prevention Hotline - Dial 9-8-8  [facility]: [phone] or [phone]  [facility]: [phone]  [facility]: [phone]  [facility]: [phone]  [facility]: [phone] or [phone]  [facility]: [phone]  [facility]: [phone] or [facility] Crisis: [phone]  [facility]: [phone] (daytime).       [phone] (after hours, weekends, holidays)     Child/Adolescent Crisis Numbers   [facility]: [phone]   [facility]: [phone]   [place]: [phone]   [facility]: [phone]  Please note: Some [facility] do not have a separate number for Child/Adolescent specific crisis. If your county is not listed under Child/Adolescent, please  call the adult number for your county     National Talk to Text Line   All Ages - 741-741    National Suicide Prevention Hotline: 1-380.564.2078 or call 310.

## 2024-01-14 DIAGNOSIS — F43.10 PTSD (POST-TRAUMATIC STRESS DISORDER): ICD-10-CM

## 2024-01-14 DIAGNOSIS — F41.1 GENERALIZED ANXIETY DISORDER WITH PANIC ATTACKS: ICD-10-CM

## 2024-01-14 DIAGNOSIS — F41.0 GENERALIZED ANXIETY DISORDER WITH PANIC ATTACKS: ICD-10-CM

## 2024-01-14 DIAGNOSIS — F33.2 SEVERE EPISODE OF RECURRENT MAJOR DEPRESSIVE DISORDER, WITHOUT PSYCHOTIC FEATURES (HCC): ICD-10-CM

## 2024-01-15 RX ORDER — MIRTAZAPINE 15 MG/1
15 TABLET, FILM COATED ORAL
Qty: 30 TABLET | Refills: 0 | Status: SHIPPED | OUTPATIENT
Start: 2024-01-15

## 2024-01-29 ENCOUNTER — PATIENT OUTREACH (OUTPATIENT)
Dept: OBGYN CLINIC | Facility: CLINIC | Age: 34
End: 2024-01-29

## 2024-02-01 NOTE — PROGRESS NOTES
VINCE BAIG attempted to outreach pt for her missed appointment. VINCE BAIG left a VM encouraging the pt to re-schedule

## 2024-02-08 ENCOUNTER — PATIENT OUTREACH (OUTPATIENT)
Dept: OBGYN CLINIC | Facility: CLINIC | Age: 34
End: 2024-02-08

## 2024-02-08 NOTE — PROGRESS NOTES
VINEC BAIG attempted to outreach pt today to check in and see if we can get her scheduled for a psychiatry follow up. There was no answer. VINCE BAIG will attempt a final time next week before closing this encounter.

## 2024-02-15 ENCOUNTER — PATIENT OUTREACH (OUTPATIENT)
Dept: OBGYN CLINIC | Facility: CLINIC | Age: 34
End: 2024-02-15

## 2024-02-16 NOTE — PROGRESS NOTES
VINCE BAIG attempted to reach out to see how pt is doing and see if we can get her re-connected with the psychiatrist in the office. There is no answer. VINCE BAIG left a VM for a return call. VINCE BAIG will close this encounter at this time as VINCE BAIG as been unable to contact the pt for follow up and pt has no appointments scheduled in our office at this time.     Please re-refer as needed.

## 2024-03-15 ENCOUNTER — TELEPHONE (OUTPATIENT)
Dept: OBGYN CLINIC | Facility: CLINIC | Age: 34
End: 2024-03-15

## 2024-03-19 ENCOUNTER — OFFICE VISIT (OUTPATIENT)
Dept: OBGYN CLINIC | Facility: CLINIC | Age: 34
End: 2024-03-19

## 2024-03-19 VITALS
DIASTOLIC BLOOD PRESSURE: 88 MMHG | BODY MASS INDEX: 31.45 KG/M2 | HEART RATE: 102 BPM | WEIGHT: 156 LBS | HEIGHT: 59 IN | SYSTOLIC BLOOD PRESSURE: 126 MMHG

## 2024-03-19 DIAGNOSIS — Z30.09 UNWANTED FERTILITY: ICD-10-CM

## 2024-03-19 DIAGNOSIS — Z59.9 FINANCIAL DIFFICULTIES: Primary | ICD-10-CM

## 2024-03-19 PROBLEM — F32.A DEPRESSION: Status: RESOLVED | Noted: 2019-03-26 | Resolved: 2024-03-19

## 2024-03-19 PROBLEM — O03.9 MISCARRIAGE: Status: RESOLVED | Noted: 2023-11-29 | Resolved: 2024-03-19

## 2024-03-19 PROCEDURE — 99213 OFFICE O/P EST LOW 20 MIN: CPT | Performed by: NURSE PRACTITIONER

## 2024-03-19 RX ORDER — NORGESTIMATE AND ETHINYL ESTRADIOL 0.25-0.035
1 KIT ORAL DAILY
Qty: 28 TABLET | Refills: 7 | Status: SHIPPED | OUTPATIENT
Start: 2024-03-19

## 2024-03-19 SDOH — ECONOMIC STABILITY - INCOME SECURITY: PROBLEM RELATED TO HOUSING AND ECONOMIC CIRCUMSTANCES, UNSPECIFIED: Z59.9

## 2024-03-19 NOTE — PATIENT INSTRUCTIONS
Thank you for your confidence in our team.   We appreciate you and welcome your feedback.   If you receive a survey from us, please take a few moments to let us know how we are doing.   Sincerely,  ANGELO Peguero

## 2024-03-19 NOTE — PROGRESS NOTES
PROBLEM GYNECOLOGICAL VISIT    Pooja Ortiz is a 33 y.o. female who presents today with complaint of unwanted fertility.  Her general medical history has been reviewed and she reports it as follows:    Past Medical History:   Diagnosis Date    Depression      Past Surgical History:   Procedure Laterality Date    ABDOMINOPLASTY  2015    BREAST IMPLANT Bilateral 2016     SECTION  2008     OB History          4    Para   1    Term   1       0    AB   3    Living   1         SAB   1    IAB   2    Ectopic   0    Multiple   0    Live Births   1               Social History     Tobacco Use    Smoking status: Every Day     Current packs/day: 0.25     Types: Cigarettes    Smokeless tobacco: Never   Vaping Use    Vaping status: Every Day    Substances: Nicotine, Flavoring   Substance Use Topics    Alcohol use: Yes     Comment: couple times/year    Drug use: Yes     Types: Marijuana     Comment: couple times/week     Social History     Substance and Sexual Activity   Sexual Activity Yes    Partners: Male    Birth control/protection: None       Current Outpatient Medications   Medication Instructions    Multiple Vitamins-Minerals (multivitamin with minerals) tablet 1 tablet, Oral, Daily       History of Present Illness:   Patient has used OCP's in the past but stopped them about 3-4 months ago as she was worried that they were causing her to gain weight.  However, she now believes that it was her anti-depressants that were causing her to gain weight, so she has stopped all of her anti-depressants.  I reviewed with her that she has actually gained 9# since she last saw me in 10/2023 and that is approximately when she stopped the OCP's.  She continues to be sexually active and reports is using condoms.  She desires to resume OCP's at this time.    Review of Systems:  Review of Systems   Gastrointestinal: Negative.    Genitourinary: Negative.        Physical Exam:  /88 (BP Location: Left arm,  "Patient Position: Sitting, Cuff Size: Standard)   Pulse 102   Ht 4' 11\" (1.499 m)   Wt 70.8 kg (156 lb)   LMP 03/05/2024   BMI 31.51 kg/m²   Physical Exam  Constitutional:       General: She is not in acute distress.  Neurological:      Mental Status: She is alert.   Skin:     General: Skin is warm and dry.   Vitals reviewed.       Assessment:   1. Unwanted fertility.    Plan:   1. Given Rx OCP's and reminded of appropriate administration.   2. Return to office in 7 months for annual GYN exam.   3. Patient's depression screening was assessed with a PHQ-2 score of 4. Their PHQ-9 score was 19. Continue regular follow-up with their psychologist/therapist/psychiatrist who is managing their mental health condition(s).  Strongly encouraged patient to connect with her psychiatrist ASAP regarding having stopped all of her antidepressant therapy and her concern about weight gain.    "

## 2024-03-20 ENCOUNTER — PATIENT OUTREACH (OUTPATIENT)
Dept: OBGYN CLINIC | Facility: CLINIC | Age: 34
End: 2024-03-20

## 2024-03-20 NOTE — PROGRESS NOTES
VINCE BAIG received a referral from ANGELO Silver for a PHQ-9 of 19, pt had stopped taking the meds prescribed by Dr. Cardona because she felt that she was gaining weight on them. Abril encouraged pt to f/u with Dr. Cardona however no f/u appt with her was scheduled at the time of her check out from her GYN appointment.     VINCE BAIG called pt today, VINCE BAIG left a VM for pt to call the office back to r/s an appointment if she would like to re-establish psychiatric care. Pt currently uninsured.     VINCE BAIG will close this referral at this time as pt was recently reaching out to pt for the same reason a month ago.

## 2024-09-18 ENCOUNTER — TELEPHONE (OUTPATIENT)
Dept: OBGYN CLINIC | Facility: CLINIC | Age: 34
End: 2024-09-18